# Patient Record
Sex: MALE | Race: OTHER | Employment: UNEMPLOYED | URBAN - METROPOLITAN AREA
[De-identification: names, ages, dates, MRNs, and addresses within clinical notes are randomized per-mention and may not be internally consistent; named-entity substitution may affect disease eponyms.]

---

## 2021-03-10 ENCOUNTER — HOSPITAL ENCOUNTER (OUTPATIENT)
Dept: GENERAL RADIOLOGY | Age: 66
Discharge: HOME | End: 2021-03-10
Payer: MEDICARE

## 2021-03-10 ENCOUNTER — TRANSCRIBE ORDER (OUTPATIENT)
Dept: REGISTRATION | Age: 66
End: 2021-03-10

## 2021-03-10 DIAGNOSIS — M25.561 RIGHT KNEE PAIN: Primary | ICD-10-CM

## 2021-03-10 DIAGNOSIS — M25.561 RIGHT KNEE PAIN: ICD-10-CM

## 2021-03-10 PROCEDURE — 73565 X-RAY EXAM OF KNEES: CPT

## 2021-03-10 PROCEDURE — 73560 X-RAY EXAM OF KNEE 1 OR 2: CPT

## 2022-10-07 ENCOUNTER — APPOINTMENT (OUTPATIENT)
Dept: CT IMAGING | Age: 67
End: 2022-10-07
Attending: EMERGENCY MEDICINE
Payer: MEDICARE

## 2022-10-07 ENCOUNTER — HOSPITAL ENCOUNTER (OUTPATIENT)
Age: 67
Setting detail: OBSERVATION
Discharge: HOME | End: 2022-10-09
Attending: EMERGENCY MEDICINE | Admitting: INTERNAL MEDICINE
Payer: MEDICARE

## 2022-10-07 DIAGNOSIS — R41.82 ALTERED MENTAL STATUS, UNSPECIFIED ALTERED MENTAL STATUS TYPE: Primary | ICD-10-CM

## 2022-10-07 DIAGNOSIS — G93.40 ENCEPHALOPATHY ACUTE: ICD-10-CM

## 2022-10-07 LAB
AMMONIA PLAS-SCNC: 19 UMOL/L
AMPHET UR QL SCN: NEGATIVE
ANION GAP SERPL CALC-SCNC: 4 MMOL/L (ref 5–15)
APPEARANCE UR: CLEAR
BACTERIA URNS QL MICRO: NEGATIVE /HPF
BARBITURATES UR QL SCN: NEGATIVE
BASOPHILS # BLD: 0.1 K/UL (ref 0–0.1)
BASOPHILS NFR BLD: 1 % (ref 0–1)
BENZODIAZ UR QL: NEGATIVE
BILIRUB UR QL: NEGATIVE
BUN SERPL-MCNC: 13 MG/DL (ref 6–20)
BUN/CREAT SERPL: 15 (ref 12–20)
CALCIUM SERPL-MCNC: 8.9 MG/DL (ref 8.5–10.1)
CANNABINOIDS UR QL SCN: POSITIVE
CHLORIDE SERPL-SCNC: 103 MMOL/L (ref 97–108)
CO2 SERPL-SCNC: 30 MMOL/L (ref 21–32)
COCAINE UR QL SCN: NEGATIVE
COLOR UR: NORMAL
COMMENT, HOLDF: NORMAL
CREAT SERPL-MCNC: 0.87 MG/DL (ref 0.7–1.3)
DIFFERENTIAL METHOD BLD: ABNORMAL
DRUG SCRN COMMENT,DRGCM: ABNORMAL
EOSINOPHIL # BLD: 0.1 K/UL (ref 0–0.4)
EOSINOPHIL NFR BLD: 1 % (ref 0–7)
EPITH CASTS URNS QL MICRO: NORMAL /LPF
ERYTHROCYTE [DISTWIDTH] IN BLOOD BY AUTOMATED COUNT: 14.6 % (ref 11.5–14.5)
ETHANOL SERPL-MCNC: <10 MG/DL
GLUCOSE SERPL-MCNC: 152 MG/DL (ref 65–100)
GLUCOSE UR STRIP.AUTO-MCNC: NEGATIVE MG/DL
HCT VFR BLD AUTO: 39 % (ref 36.6–50.3)
HGB BLD-MCNC: 12.7 G/DL (ref 12.1–17)
HGB UR QL STRIP: NEGATIVE
HYALINE CASTS URNS QL MICRO: NORMAL /LPF (ref 0–2)
IMM GRANULOCYTES # BLD AUTO: 0 K/UL (ref 0–0.04)
IMM GRANULOCYTES NFR BLD AUTO: 0 % (ref 0–0.5)
KETONES UR QL STRIP.AUTO: NEGATIVE MG/DL
LEUKOCYTE ESTERASE UR QL STRIP.AUTO: NEGATIVE
LYMPHOCYTES # BLD: 1.6 K/UL (ref 0.8–3.5)
LYMPHOCYTES NFR BLD: 22 % (ref 12–49)
MCH RBC QN AUTO: 29.8 PG (ref 26–34)
MCHC RBC AUTO-ENTMCNC: 32.6 G/DL (ref 30–36.5)
MCV RBC AUTO: 91.5 FL (ref 80–99)
METHADONE UR QL: NEGATIVE
MONOCYTES # BLD: 0.4 K/UL (ref 0–1)
MONOCYTES NFR BLD: 5 % (ref 5–13)
NEUTS SEG # BLD: 5.1 K/UL (ref 1.8–8)
NEUTS SEG NFR BLD: 71 % (ref 32–75)
NITRITE UR QL STRIP.AUTO: NEGATIVE
NRBC # BLD: 0 K/UL (ref 0–0.01)
NRBC BLD-RTO: 0 PER 100 WBC
OPIATES UR QL: POSITIVE
PCP UR QL: NEGATIVE
PH UR STRIP: 8 [PH] (ref 5–8)
PLATELET # BLD AUTO: 299 K/UL (ref 150–400)
PMV BLD AUTO: 9 FL (ref 8.9–12.9)
POTASSIUM SERPL-SCNC: 3.5 MMOL/L (ref 3.5–5.1)
PROT UR STRIP-MCNC: NEGATIVE MG/DL
RBC # BLD AUTO: 4.26 M/UL (ref 4.1–5.7)
RBC #/AREA URNS HPF: NORMAL /HPF (ref 0–5)
SAMPLES BEING HELD,HOLD: NORMAL
SODIUM SERPL-SCNC: 137 MMOL/L (ref 136–145)
SP GR UR REFRACTOMETRY: 1.01 (ref 1–1.03)
UA: UC IF INDICATED,UAUC: NORMAL
UROBILINOGEN UR QL STRIP.AUTO: 0.2 EU/DL (ref 0.2–1)
WBC # BLD AUTO: 7.3 K/UL (ref 4.1–11.1)
WBC URNS QL MICRO: NORMAL /HPF (ref 0–4)

## 2022-10-07 PROCEDURE — 96374 THER/PROPH/DIAG INJ IV PUSH: CPT

## 2022-10-07 PROCEDURE — 96376 TX/PRO/DX INJ SAME DRUG ADON: CPT

## 2022-10-07 PROCEDURE — 85025 COMPLETE CBC W/AUTO DIFF WBC: CPT

## 2022-10-07 PROCEDURE — 74011250636 HC RX REV CODE- 250/636: Performed by: EMERGENCY MEDICINE

## 2022-10-07 PROCEDURE — 70450 CT HEAD/BRAIN W/O DYE: CPT

## 2022-10-07 PROCEDURE — 36415 COLL VENOUS BLD VENIPUNCTURE: CPT

## 2022-10-07 PROCEDURE — 74011250637 HC RX REV CODE- 250/637: Performed by: EMERGENCY MEDICINE

## 2022-10-07 PROCEDURE — 82077 ASSAY SPEC XCP UR&BREATH IA: CPT

## 2022-10-07 PROCEDURE — G0378 HOSPITAL OBSERVATION PER HR: HCPCS

## 2022-10-07 PROCEDURE — 80307 DRUG TEST PRSMV CHEM ANLYZR: CPT

## 2022-10-07 PROCEDURE — 82140 ASSAY OF AMMONIA: CPT

## 2022-10-07 PROCEDURE — 96372 THER/PROPH/DIAG INJ SC/IM: CPT

## 2022-10-07 PROCEDURE — 81001 URINALYSIS AUTO W/SCOPE: CPT

## 2022-10-07 PROCEDURE — 99285 EMERGENCY DEPT VISIT HI MDM: CPT

## 2022-10-07 PROCEDURE — 74011250636 HC RX REV CODE- 250/636: Performed by: INTERNAL MEDICINE

## 2022-10-07 PROCEDURE — 96375 TX/PRO/DX INJ NEW DRUG ADDON: CPT

## 2022-10-07 PROCEDURE — 80048 BASIC METABOLIC PNL TOTAL CA: CPT

## 2022-10-07 RX ORDER — SODIUM CHLORIDE 0.9 % (FLUSH) 0.9 %
5-40 SYRINGE (ML) INJECTION AS NEEDED
Status: DISCONTINUED | OUTPATIENT
Start: 2022-10-07 | End: 2022-10-09 | Stop reason: HOSPADM

## 2022-10-07 RX ORDER — MAGNESIUM SULFATE 100 %
4 CRYSTALS MISCELLANEOUS AS NEEDED
Status: DISCONTINUED | OUTPATIENT
Start: 2022-10-07 | End: 2022-10-09 | Stop reason: HOSPADM

## 2022-10-07 RX ORDER — SODIUM CHLORIDE 0.9 % (FLUSH) 0.9 %
5-40 SYRINGE (ML) INJECTION EVERY 8 HOURS
Status: DISCONTINUED | OUTPATIENT
Start: 2022-10-07 | End: 2022-10-09 | Stop reason: HOSPADM

## 2022-10-07 RX ORDER — QUETIAPINE FUMARATE 25 MG/1
50 TABLET, FILM COATED ORAL ONCE
Status: COMPLETED | OUTPATIENT
Start: 2022-10-07 | End: 2022-10-07

## 2022-10-07 RX ORDER — LORAZEPAM 2 MG/ML
2 INJECTION INTRAMUSCULAR ONCE
Status: COMPLETED | OUTPATIENT
Start: 2022-10-07 | End: 2022-10-07

## 2022-10-07 RX ORDER — DEXTROSE MONOHYDRATE 100 MG/ML
0-250 INJECTION, SOLUTION INTRAVENOUS AS NEEDED
Status: DISCONTINUED | OUTPATIENT
Start: 2022-10-07 | End: 2022-10-09 | Stop reason: HOSPADM

## 2022-10-07 RX ORDER — ONDANSETRON 2 MG/ML
4 INJECTION INTRAMUSCULAR; INTRAVENOUS
Status: DISCONTINUED | OUTPATIENT
Start: 2022-10-07 | End: 2022-10-09 | Stop reason: HOSPADM

## 2022-10-07 RX ORDER — ENOXAPARIN SODIUM 100 MG/ML
30 INJECTION SUBCUTANEOUS EVERY 12 HOURS
Status: DISCONTINUED | OUTPATIENT
Start: 2022-10-07 | End: 2022-10-09 | Stop reason: HOSPADM

## 2022-10-07 RX ORDER — ACETAMINOPHEN 650 MG/1
650 SUPPOSITORY RECTAL
Status: DISCONTINUED | OUTPATIENT
Start: 2022-10-07 | End: 2022-10-09 | Stop reason: HOSPADM

## 2022-10-07 RX ORDER — FENTANYL CITRATE 50 UG/ML
100 INJECTION, SOLUTION INTRAMUSCULAR; INTRAVENOUS ONCE
Status: COMPLETED | OUTPATIENT
Start: 2022-10-07 | End: 2022-10-07

## 2022-10-07 RX ORDER — MIDAZOLAM HYDROCHLORIDE 1 MG/ML
2 INJECTION, SOLUTION INTRAMUSCULAR; INTRAVENOUS ONCE
Status: COMPLETED | OUTPATIENT
Start: 2022-10-07 | End: 2022-10-07

## 2022-10-07 RX ORDER — INSULIN LISPRO 100 [IU]/ML
INJECTION, SOLUTION INTRAVENOUS; SUBCUTANEOUS
Status: DISCONTINUED | OUTPATIENT
Start: 2022-10-07 | End: 2022-10-09 | Stop reason: HOSPADM

## 2022-10-07 RX ORDER — DIPHENHYDRAMINE HYDROCHLORIDE 50 MG/ML
25 INJECTION, SOLUTION INTRAMUSCULAR; INTRAVENOUS ONCE
Status: COMPLETED | OUTPATIENT
Start: 2022-10-07 | End: 2022-10-07

## 2022-10-07 RX ORDER — ACETAMINOPHEN 325 MG/1
650 TABLET ORAL
Status: DISCONTINUED | OUTPATIENT
Start: 2022-10-07 | End: 2022-10-09 | Stop reason: HOSPADM

## 2022-10-07 RX ORDER — ONDANSETRON 4 MG/1
4 TABLET, ORALLY DISINTEGRATING ORAL
Status: DISCONTINUED | OUTPATIENT
Start: 2022-10-07 | End: 2022-10-09 | Stop reason: HOSPADM

## 2022-10-07 RX ORDER — POLYETHYLENE GLYCOL 3350 17 G/17G
17 POWDER, FOR SOLUTION ORAL DAILY PRN
Status: DISCONTINUED | OUTPATIENT
Start: 2022-10-07 | End: 2022-10-09 | Stop reason: HOSPADM

## 2022-10-07 RX ADMIN — FENTANYL CITRATE 100 MCG: 50 INJECTION INTRAMUSCULAR; INTRAVENOUS at 18:03

## 2022-10-07 RX ADMIN — MIDAZOLAM HYDROCHLORIDE 2 MG: 1 INJECTION, SOLUTION INTRAMUSCULAR; INTRAVENOUS at 16:26

## 2022-10-07 RX ADMIN — ENOXAPARIN SODIUM 30 MG: 100 INJECTION SUBCUTANEOUS at 20:13

## 2022-10-07 RX ADMIN — QUETIAPINE FUMARATE 50 MG: 25 TABLET ORAL at 20:13

## 2022-10-07 RX ADMIN — LORAZEPAM 2 MG: 2 INJECTION INTRAMUSCULAR; INTRAVENOUS at 13:59

## 2022-10-07 RX ADMIN — DIPHENHYDRAMINE HYDROCHLORIDE 25 MG: 50 INJECTION, SOLUTION INTRAMUSCULAR; INTRAVENOUS at 18:05

## 2022-10-07 RX ADMIN — LORAZEPAM 2 MG: 2 INJECTION INTRAMUSCULAR; INTRAVENOUS at 14:24

## 2022-10-07 RX ADMIN — SODIUM CHLORIDE 1000 ML: 9 INJECTION, SOLUTION INTRAVENOUS at 14:01

## 2022-10-07 NOTE — ED NOTES
Pt is restless and attempting to get out of bed. Pt tearing off monitoring devises. Unable to obtain accurate blood pressure. Pt in bed with legs raised and side rails up.  Unable to get CT due to pt movement

## 2022-10-07 NOTE — ED PROVIDER NOTES
49-year-old male with history of diabetes, hypertension, obesity, high cholesterol presents to the emergency department by EMS with chief complaint of altered mental status. His daughter called EMS because he has been acting very for the past 3 hours. He is apparently on multiple pain medications including narcotics and may have smoked marijuana this morning. Patient is a poor historian, keeps telling me he is thirsty, fidgeting in the bed. He has removed his pulse oximeter and is trying to remove his chest leads. There is no report of fever, chills, trauma. The history is provided by the patient, the EMS personnel, a relative and medical records. Altered mental status   This is a new problem. The current episode started 3 to 5 hours ago. The problem has not changed since onset. Associated symptoms include confusion. Functional status baseline:  [EPIC#1537^NOTE}      No past medical history on file. No past surgical history on file. No family history on file. Social History     Socioeconomic History    Marital status:      Spouse name: Not on file    Number of children: Not on file    Years of education: Not on file    Highest education level: Not on file   Occupational History    Not on file   Tobacco Use    Smoking status: Not on file    Smokeless tobacco: Not on file   Substance and Sexual Activity    Alcohol use: Not on file    Drug use: Not on file    Sexual activity: Not on file   Other Topics Concern    Not on file   Social History Narrative    Not on file     Social Determinants of Health     Financial Resource Strain: Not on file   Food Insecurity: Not on file   Transportation Needs: Not on file   Physical Activity: Not on file   Stress: Not on file   Social Connections: Not on file   Intimate Partner Violence: Not on file   Housing Stability: Not on file         ALLERGIES: Patient has no known allergies.     Review of Systems   Unable to perform ROS: Mental status change   Psychiatric/Behavioral:  Positive for confusion. Vitals:    10/07/22 1253   BP: (!) 176/95   Pulse: 95   Resp: 24   SpO2: 94%   Weight: 125 kg (275 lb 9.2 oz)            Physical Exam  Vitals and nursing note reviewed. Constitutional:       General: He is not in acute distress. Appearance: Normal appearance. He is well-developed. He is obese. He is not ill-appearing, toxic-appearing or diaphoretic. HENT:      Head: Normocephalic and atraumatic. Nose: Nose normal.      Mouth/Throat:      Mouth: Mucous membranes are moist.      Pharynx: Oropharynx is clear. Eyes:      Extraocular Movements: Extraocular movements intact. Conjunctiva/sclera: Conjunctivae normal.      Pupils: Pupils are equal, round, and reactive to light. Cardiovascular:      Rate and Rhythm: Normal rate and regular rhythm. Pulses: Normal pulses. Heart sounds: No murmur heard. Pulmonary:      Effort: Pulmonary effort is normal. No respiratory distress. Breath sounds: Normal breath sounds. No wheezing. Chest:      Chest wall: No mass or tenderness. Abdominal:      General: There is no distension. Palpations: Abdomen is soft. Tenderness: There is no abdominal tenderness. There is no guarding or rebound. Musculoskeletal:         General: No swelling, tenderness, deformity or signs of injury. Normal range of motion. Cervical back: Normal range of motion and neck supple. No rigidity. No muscular tenderness. Right lower leg: No tenderness. No edema. Left lower leg: No tenderness. No edema. Skin:     General: Skin is warm and dry. Capillary Refill: Capillary refill takes less than 2 seconds. Neurological:      General: No focal deficit present. Mental Status: He is alert. Comments: Moves all extremities, is fidgety but without tremor   Psychiatric:         Behavior: Behavior is hyperactive.         MDM  Number of Diagnoses or Management Options  Diagnosis management comments: 26-year-old male presents as above with altered mental status. He has been encephalopathic of unclear etiology, although suspect substance use/abuse. He was seen by teleneurology with concern for potential stroke who feels that he is very low risk for stroke. Would recommend Seroquel as tolerated to keep him calm. Plan for admission for further management. Amount and/or Complexity of Data Reviewed  Clinical lab tests: reviewed  Tests in the radiology section of CPT®: reviewed      ED Course as of 10/07/22 1857   Fri Oct 07, 2022   1740 Despite repeat doses of benzodiazepines patient continues to have abnormal movement in the bed, is not redirectable, abnormal speech [JM]   1816 Speech still not resolved, nonfocal on exam.  Have been unable to obtain CT thus far. Will elevate to code stroke level 2. He would not be a lytic candidate. [JM]   9139 Discussed with teleneurology, recommends Seroquel x1 to facilitate CTA. We will see the patient once they are back from non-con CT. [JM]   1856 Discussed with teleneurology. Low risk for this to be a stroke. More consistent with encephalopathy. Recommends admission for metabolic/toxic encephalopathy. [JM]      ED Course User Index  [JM] Sheryl Sanabria MD       Procedures          Perfect Serve Consult for Admission  6:58 PM    ED Room Number: ER07/07  Patient Name and age:  Nicole Loza 79 y.o.  male  Working Diagnosis:   1. Altered mental status, unspecified altered mental status type    2.  Encephalopathy acute        COVID-19 Suspicion:  no  Sepsis present:  no  Reassessment needed: N/A  Code Status:  Full Code  Readmission: no  Isolation Requirements:  no  Recommended Level of Care:  telemetry  Department:[facility] ED - [phone]  Other: Encephalopathy likely due to marijuana use earlier today

## 2022-10-07 NOTE — ED TRIAGE NOTES
Pt to ED vis EMS after having new onset of altered mental status starting around 1000 this am. Family reports pt unable to control movement of his arms and legs. Pt has history of chronic pain and significant amounts of pain medication prescription. Family reports pt was smoking marijuana today but don't know if that is new for him to do.

## 2022-10-07 NOTE — ED NOTES
Pt continues to be restless and attempting to get out of bed. Bed alarm on, rails up, legs raised. Attempting to monitor pt but pt ripping monitors off. Pt does not show any signs and symptoms of distress.  Unable to get CT due to pt movement

## 2022-10-07 NOTE — PROGRESS NOTES
Dear Dr Papi Elizalde,    The Lovenox dose/schedule was changed to reflect the new Enoxaparin Routine Prophylaxis Dosing guidelines which is based on the patient's weight and renal function. The dose/schedule was changed from 40mg sq q24h to 30mg sq bid. Thank you,    Venancio Rosado. Zackary, Pharm D.         Contact: [phone]

## 2022-10-08 ENCOUNTER — APPOINTMENT (OUTPATIENT)
Dept: MRI IMAGING | Age: 67
End: 2022-10-08
Attending: INTERNAL MEDICINE
Payer: MEDICARE

## 2022-10-08 ENCOUNTER — APPOINTMENT (OUTPATIENT)
Dept: GENERAL RADIOLOGY | Age: 67
End: 2022-10-08
Attending: INTERNAL MEDICINE
Payer: MEDICARE

## 2022-10-08 LAB
ALBUMIN SERPL-MCNC: 3.3 G/DL (ref 3.5–5)
ALBUMIN/GLOB SERPL: 0.8 {RATIO} (ref 1.1–2.2)
ALP SERPL-CCNC: 60 U/L (ref 45–117)
ALT SERPL-CCNC: 29 U/L (ref 12–78)
AMMONIA PLAS-SCNC: 39 UMOL/L
ANION GAP SERPL CALC-SCNC: 6 MMOL/L (ref 5–15)
AST SERPL-CCNC: 36 U/L (ref 15–37)
BILIRUB SERPL-MCNC: 0.4 MG/DL (ref 0.2–1)
BUN SERPL-MCNC: 14 MG/DL (ref 6–20)
BUN/CREAT SERPL: 19 (ref 12–20)
CALCIUM SERPL-MCNC: 8.6 MG/DL (ref 8.5–10.1)
CHLORIDE SERPL-SCNC: 109 MMOL/L (ref 97–108)
CO2 SERPL-SCNC: 27 MMOL/L (ref 21–32)
CREAT SERPL-MCNC: 0.75 MG/DL (ref 0.7–1.3)
EST. AVERAGE GLUCOSE BLD GHB EST-MCNC: 134 MG/DL
GLOBULIN SER CALC-MCNC: 3.9 G/DL (ref 2–4)
GLUCOSE BLD STRIP.AUTO-MCNC: 103 MG/DL (ref 65–117)
GLUCOSE BLD STRIP.AUTO-MCNC: 104 MG/DL (ref 65–117)
GLUCOSE BLD STRIP.AUTO-MCNC: 112 MG/DL (ref 65–117)
GLUCOSE BLD STRIP.AUTO-MCNC: 123 MG/DL (ref 65–117)
GLUCOSE SERPL-MCNC: 116 MG/DL (ref 65–100)
HBA1C MFR BLD: 6.3 % (ref 4–5.6)
POTASSIUM SERPL-SCNC: 3.2 MMOL/L (ref 3.5–5.1)
PROT SERPL-MCNC: 7.2 G/DL (ref 6.4–8.2)
SERVICE CMNT-IMP: ABNORMAL
SERVICE CMNT-IMP: NORMAL
SODIUM SERPL-SCNC: 142 MMOL/L (ref 136–145)
TSH SERPL DL<=0.05 MIU/L-ACNC: 0.94 UIU/ML (ref 0.36–3.74)
VIT B12 SERPL-MCNC: 202 PG/ML (ref 193–986)

## 2022-10-08 PROCEDURE — 74011250637 HC RX REV CODE- 250/637: Performed by: INTERNAL MEDICINE

## 2022-10-08 PROCEDURE — 83036 HEMOGLOBIN GLYCOSYLATED A1C: CPT

## 2022-10-08 PROCEDURE — 36415 COLL VENOUS BLD VENIPUNCTURE: CPT

## 2022-10-08 PROCEDURE — 82607 VITAMIN B-12: CPT

## 2022-10-08 PROCEDURE — 74011250636 HC RX REV CODE- 250/636: Performed by: INTERNAL MEDICINE

## 2022-10-08 PROCEDURE — 96375 TX/PRO/DX INJ NEW DRUG ADDON: CPT

## 2022-10-08 PROCEDURE — 96376 TX/PRO/DX INJ SAME DRUG ADON: CPT

## 2022-10-08 PROCEDURE — 82140 ASSAY OF AMMONIA: CPT

## 2022-10-08 PROCEDURE — 84443 ASSAY THYROID STIM HORMONE: CPT

## 2022-10-08 PROCEDURE — 74011000250 HC RX REV CODE- 250: Performed by: INTERNAL MEDICINE

## 2022-10-08 PROCEDURE — 99205 OFFICE O/P NEW HI 60 MIN: CPT | Performed by: PSYCHIATRY & NEUROLOGY

## 2022-10-08 PROCEDURE — 82962 GLUCOSE BLOOD TEST: CPT

## 2022-10-08 PROCEDURE — G0378 HOSPITAL OBSERVATION PER HR: HCPCS

## 2022-10-08 PROCEDURE — 96372 THER/PROPH/DIAG INJ SC/IM: CPT

## 2022-10-08 PROCEDURE — 73030 X-RAY EXAM OF SHOULDER: CPT

## 2022-10-08 PROCEDURE — 80053 COMPREHEN METABOLIC PANEL: CPT

## 2022-10-08 RX ORDER — FLUTICASONE PROPIONATE 50 MCG
2 SPRAY, SUSPENSION (ML) NASAL DAILY
Status: DISCONTINUED | OUTPATIENT
Start: 2022-10-08 | End: 2022-10-09 | Stop reason: HOSPADM

## 2022-10-08 RX ORDER — OXYCODONE HYDROCHLORIDE 5 MG/1
15 TABLET ORAL
Status: DISCONTINUED | OUTPATIENT
Start: 2022-10-08 | End: 2022-10-08

## 2022-10-08 RX ORDER — DOXAZOSIN 2 MG/1
8 TABLET ORAL DAILY
Status: DISCONTINUED | OUTPATIENT
Start: 2022-10-08 | End: 2022-10-09 | Stop reason: HOSPADM

## 2022-10-08 RX ORDER — DICLOFENAC SODIUM 75 MG/1
TABLET, DELAYED RELEASE ORAL
COMMUNITY

## 2022-10-08 RX ORDER — OXYCODONE HYDROCHLORIDE 5 MG/1
5 TABLET ORAL
Status: DISCONTINUED | OUTPATIENT
Start: 2022-10-08 | End: 2022-10-08

## 2022-10-08 RX ORDER — FINASTERIDE 5 MG/1
5 TABLET, FILM COATED ORAL DAILY
Status: DISCONTINUED | OUTPATIENT
Start: 2022-10-09 | End: 2022-10-09 | Stop reason: HOSPADM

## 2022-10-08 RX ORDER — PANTOPRAZOLE SODIUM 40 MG/1
40 TABLET, DELAYED RELEASE ORAL
Status: DISCONTINUED | OUTPATIENT
Start: 2022-10-09 | End: 2022-10-09 | Stop reason: HOSPADM

## 2022-10-08 RX ORDER — CELECOXIB 100 MG/1
200 CAPSULE ORAL 2 TIMES DAILY
Status: DISCONTINUED | OUTPATIENT
Start: 2022-10-08 | End: 2022-10-09 | Stop reason: HOSPADM

## 2022-10-08 RX ORDER — DULOXETIN HYDROCHLORIDE 60 MG/1
60 CAPSULE, DELAYED RELEASE ORAL DAILY
COMMUNITY

## 2022-10-08 RX ORDER — TRIAMCINOLONE ACETONIDE 1 MG/G
CREAM TOPICAL 2 TIMES DAILY
COMMUNITY

## 2022-10-08 RX ORDER — CELECOXIB 200 MG/1
CAPSULE ORAL 2 TIMES DAILY
COMMUNITY

## 2022-10-08 RX ORDER — DIPHENHYDRAMINE HYDROCHLORIDE 50 MG/ML
50 INJECTION, SOLUTION INTRAMUSCULAR; INTRAVENOUS
Status: DISCONTINUED | OUTPATIENT
Start: 2022-10-08 | End: 2022-10-09 | Stop reason: HOSPADM

## 2022-10-08 RX ORDER — FAMOTIDINE 40 MG/1
40 TABLET, FILM COATED ORAL
COMMUNITY

## 2022-10-08 RX ORDER — PRAVASTATIN SODIUM 80 MG/1
80 TABLET ORAL DAILY
COMMUNITY

## 2022-10-08 RX ORDER — HYDROMORPHONE HYDROCHLORIDE 1 MG/ML
0.5 INJECTION, SOLUTION INTRAMUSCULAR; INTRAVENOUS; SUBCUTANEOUS
Status: DISCONTINUED | OUTPATIENT
Start: 2022-10-08 | End: 2022-10-09 | Stop reason: HOSPADM

## 2022-10-08 RX ORDER — BENZONATATE 100 MG/1
100 CAPSULE ORAL 3 TIMES DAILY
Status: DISCONTINUED | OUTPATIENT
Start: 2022-10-08 | End: 2022-10-09 | Stop reason: HOSPADM

## 2022-10-08 RX ORDER — POLYETHYLENE GLYCOL 3350 17 G/17G
17 POWDER, FOR SOLUTION ORAL DAILY
COMMUNITY

## 2022-10-08 RX ORDER — DULOXETIN HYDROCHLORIDE 30 MG/1
30 CAPSULE, DELAYED RELEASE ORAL DAILY
Status: DISCONTINUED | OUTPATIENT
Start: 2022-10-09 | End: 2022-10-09 | Stop reason: HOSPADM

## 2022-10-08 RX ORDER — OXYCODONE HYDROCHLORIDE 5 MG/1
15 TABLET ORAL
Status: DISCONTINUED | OUTPATIENT
Start: 2022-10-08 | End: 2022-10-09 | Stop reason: HOSPADM

## 2022-10-08 RX ORDER — OMEPRAZOLE 40 MG/1
40 CAPSULE, DELAYED RELEASE ORAL DAILY
COMMUNITY

## 2022-10-08 RX ORDER — DOXAZOSIN 2 MG/1
8 TABLET ORAL DAILY
Status: DISCONTINUED | OUTPATIENT
Start: 2022-10-09 | End: 2022-10-08

## 2022-10-08 RX ORDER — PRAVASTATIN SODIUM 20 MG/1
80 TABLET ORAL DAILY
Status: DISCONTINUED | OUTPATIENT
Start: 2022-10-09 | End: 2022-10-09 | Stop reason: HOSPADM

## 2022-10-08 RX ORDER — OXYCODONE HYDROCHLORIDE 15 MG/1
15 TABLET ORAL
COMMUNITY

## 2022-10-08 RX ORDER — FAMOTIDINE 20 MG/1
40 TABLET, FILM COATED ORAL
Status: DISCONTINUED | OUTPATIENT
Start: 2022-10-08 | End: 2022-10-09 | Stop reason: HOSPADM

## 2022-10-08 RX ORDER — DOXAZOSIN 8 MG/1
8 TABLET ORAL DAILY
COMMUNITY

## 2022-10-08 RX ORDER — HYDRALAZINE HYDROCHLORIDE 20 MG/ML
20 INJECTION INTRAMUSCULAR; INTRAVENOUS
Status: DISCONTINUED | OUTPATIENT
Start: 2022-10-08 | End: 2022-10-08

## 2022-10-08 RX ORDER — NORTRIPTYLINE HYDROCHLORIDE 50 MG/1
50 CAPSULE ORAL
COMMUNITY

## 2022-10-08 RX ORDER — SILDENAFIL 100 MG/1
25 TABLET, FILM COATED ORAL
COMMUNITY

## 2022-10-08 RX ORDER — HYDROXYZINE 25 MG/1
TABLET, FILM COATED ORAL
COMMUNITY

## 2022-10-08 RX ORDER — FINASTERIDE 5 MG/1
5 TABLET, FILM COATED ORAL DAILY
COMMUNITY

## 2022-10-08 RX ORDER — NORTRIPTYLINE HYDROCHLORIDE 25 MG/1
50 CAPSULE ORAL
Status: DISCONTINUED | OUTPATIENT
Start: 2022-10-08 | End: 2022-10-09 | Stop reason: HOSPADM

## 2022-10-08 RX ORDER — GUAIFENESIN 600 MG/1
600 TABLET, EXTENDED RELEASE ORAL EVERY 12 HOURS
Status: DISCONTINUED | OUTPATIENT
Start: 2022-10-08 | End: 2022-10-09 | Stop reason: HOSPADM

## 2022-10-08 RX ORDER — LABETALOL HCL 20 MG/4 ML
20 SYRINGE (ML) INTRAVENOUS
Status: DISCONTINUED | OUTPATIENT
Start: 2022-10-08 | End: 2022-10-09 | Stop reason: HOSPADM

## 2022-10-08 RX ADMIN — Medication 10 ML: at 07:07

## 2022-10-08 RX ADMIN — BENZONATATE 100 MG: 100 CAPSULE ORAL at 22:00

## 2022-10-08 RX ADMIN — NORTRIPTYLINE HYDROCHLORIDE 50 MG: 25 CAPSULE ORAL at 22:32

## 2022-10-08 RX ADMIN — HYDROMORPHONE HYDROCHLORIDE 0.5 MG: 1 INJECTION, SOLUTION INTRAMUSCULAR; INTRAVENOUS; SUBCUTANEOUS at 10:53

## 2022-10-08 RX ADMIN — DIPHENHYDRAMINE HYDROCHLORIDE 50 MG: 50 INJECTION INTRAMUSCULAR; INTRAVENOUS at 03:17

## 2022-10-08 RX ADMIN — FAMOTIDINE 20 MG: 10 INJECTION INTRAVENOUS at 03:53

## 2022-10-08 RX ADMIN — BENZONATATE 100 MG: 100 CAPSULE ORAL at 17:20

## 2022-10-08 RX ADMIN — ENOXAPARIN SODIUM 30 MG: 100 INJECTION SUBCUTANEOUS at 22:31

## 2022-10-08 RX ADMIN — DOXAZOSIN 8 MG: 2 TABLET ORAL at 18:03

## 2022-10-08 RX ADMIN — Medication 10 ML: at 22:00

## 2022-10-08 RX ADMIN — FLUTICASONE PROPIONATE 2 SPRAY: 50 SPRAY, METERED NASAL at 04:12

## 2022-10-08 RX ADMIN — ENOXAPARIN SODIUM 30 MG: 100 INJECTION SUBCUTANEOUS at 09:26

## 2022-10-08 RX ADMIN — OXYCODONE 15 MG: 5 TABLET ORAL at 22:32

## 2022-10-08 RX ADMIN — GUAIFENESIN 600 MG: 600 TABLET ORAL at 22:31

## 2022-10-08 RX ADMIN — FAMOTIDINE 40 MG: 20 TABLET, FILM COATED ORAL at 22:31

## 2022-10-08 RX ADMIN — CELECOXIB 200 MG: 100 CAPSULE ORAL at 17:20

## 2022-10-08 RX ADMIN — Medication 10 ML: at 14:56

## 2022-10-08 RX ADMIN — HYDRALAZINE HYDROCHLORIDE 20 MG: 20 INJECTION INTRAMUSCULAR; INTRAVENOUS at 02:12

## 2022-10-08 RX ADMIN — OXYCODONE 15 MG: 5 TABLET ORAL at 14:55

## 2022-10-08 NOTE — H&P
Hospitalist Admission Note    NAME:  Alta Mena   :  1955   MRN:  [id number]     Date/Time:  10/7/2022 8:01 PM    Patient PCP: Telma Hanson MD  ________________________________________________________________________    Given the patient's current clinical presentation, I have a high level of concern for decompensation if discharged from the emergency department. Complex decision making was performed, which includes reviewing the patient's available past medical records, laboratory results, and x-ray films. My assessment of this patient's clinical condition and my plan of care is as follows. Assessment / Plan:    1. Acute Metabolic Encephalopathy:    The patient comes in w/ confusion/agitation w/ acute onset earlier today    VSS   WBC normal, Hg 12.7  BUN 13, Cr 0.87  UA neg LE/neg nitrite  UDS +marijuana and +opiates  Head CT - no acute abnormality    Obs patient and cont to monitor. I suspect his acute metabolic encephalopathy is due to marijuana use w/o any other clear etiology    2. Chronic Back Pain:    Hold opiates for now    3. Obtain Med Rec:    No meds in Erx. Obtain from family so we can re-start his regimen. Body mass index is 41.9 kg/m².:  40 or above: Morbid obesity      I have personally reviewed the radiographs, laboratory data in Epic and decisions and statements above are based partially on this personal interpretation. Code Status: Full Code   Surrogate Decision Maker  Diego Ernandez (daughter)  [phone]    Prophylaxis:  Lovenox SQ     Subjective:   CHIEF COMPLAINT: Unable to obtain - AMS    HISTORY OF PRESENT ILLNESS:       The patient is a 78 y/o  M w/ PMH chronic back pain, HTN, HLD, DM2 who is brought in by his family today for altered mental status. His last normal baseline was observed at 5am.  They note that at some point he became extremely confused, agitated w/ slurred speech. He was moving all of his extremities.   There have been no new medications. His daughter suspect possible marijuana use as he smelled like it. No further history can be obtained from the patient or family as they did not witness the acute vent.     PMH:    HTN  HLD  DM2  Chronic back pain   OA  BPH    PSxH:    Gastric Bypass    Social Hx:    No smoking hx  Social alcohol use  ?+marijuana    FH:    M - DM2/HTN    No Known Allergies     Prior to Admission medications    Not on File       REVIEW OF SYSTEMS:  See HPI for details  Patient was not able to provide review of systems due to mental status change      Objective:   VITALS:    Visit Vitals  BP (!) 176/95 (BP 1 Location: Right upper arm, BP Patient Position: At rest)   Pulse (!) 101   Temp 98.5 °F (36.9 °C)   Resp 24   Ht 5' 8\" (1.727 m)   Wt 125 kg (275 lb 9.2 oz)   SpO2 97%   BMI 41.90 kg/m²     PHYSICAL EXAM:    Physical Exam:    Gen: Well-developed, well-nourished, +agitated  HEENT:  Pink conjunctivae, PERRLA, hearing intact to voice, moist mucous membranes  Neck: Supple, without masses, thyroid non-tender  Resp: No accessory muscle use, clear breath sounds without wheezes rales or rhonchi  Card: No murmurs, normal S1, S2 without thrills, bruits or peripheral edema  Abd:  Soft, non-tender, non-distended, normoactive bowel sounds are present, no palpable organomegaly and no detectable hernias  Lymph:  No cervical or inguinal adenopathy  Musc: No cyanosis or clubbing  Skin: No rashes or ulcers, skin turgor is good  Neuro:  Unable to do neuro exam d/t AMS  Psych:  Unable to do psych exam d/t AMS  _______________________________________________________________________  Care Plan discussed with:  Pt's condition, Imaging findings, Lab findings, Assessment, and Care Plan discussed with: Patient and RN  _______________________________________________________________________    Probable disposition:  Home  ________________________________________________________________________      Comments   >50% of visit spent in counseling and coordination of care  Chart reviewed  Discussion with patient and/or family and questions answered     ________________________________________________________________________  Signed: Crystal Reyes MD        Procedures: see electronic medical records for all procedures/Xrays and details which were not copied into this note but were reviewed prior to creation of Plan. LAB DATA REVIEWED:    Recent Results (from the past 24 hour(s))   SAMPLES BEING HELD    Collection Time: 10/07/22  1:02 PM   Result Value Ref Range    SAMPLES BEING HELD 1DK GRN,1BLUE,1SST,1RED     COMMENT        Add-on orders for these samples will be processed based on acceptable specimen integrity and analyte stability, which may vary by analyte. CBC WITH AUTOMATED DIFF    Collection Time: 10/07/22  1:02 PM   Result Value Ref Range    WBC 7.3 4.1 - 11.1 K/uL    RBC 4.26 4.10 - 5.70 M/uL    HGB 12.7 12.1 - 17.0 g/dL    HCT 39.0 36.6 - 50.3 %    MCV 91.5 80.0 - 99.0 FL    MCH 29.8 26.0 - 34.0 PG    MCHC 32.6 30.0 - 36.5 g/dL    RDW 14.6 (H) 11.5 - 14.5 %    PLATELET 964 631 - 026 K/uL    MPV 9.0 8.9 - 12.9 FL    NRBC 0.0 0  WBC    ABSOLUTE NRBC 0.00 0.00 - 0.01 K/uL    NEUTROPHILS 71 32 - 75 %    LYMPHOCYTES 22 12 - 49 %    MONOCYTES 5 5 - 13 %    EOSINOPHILS 1 0 - 7 %    BASOPHILS 1 0 - 1 %    IMMATURE GRANULOCYTES 0 0.0 - 0.5 %    ABS. NEUTROPHILS 5.1 1.8 - 8.0 K/UL    ABS. LYMPHOCYTES 1.6 0.8 - 3.5 K/UL    ABS. MONOCYTES 0.4 0.0 - 1.0 K/UL    ABS. EOSINOPHILS 0.1 0.0 - 0.4 K/UL    ABS. BASOPHILS 0.1 0.0 - 0.1 K/UL    ABS. IMM.  GRANS. 0.0 0.00 - 0.04 K/UL    DF AUTOMATED     METABOLIC PANEL, BASIC    Collection Time: 10/07/22  1:02 PM   Result Value Ref Range    Sodium 137 136 - 145 mmol/L    Potassium 3.5 3.5 - 5.1 mmol/L    Chloride 103 97 - 108 mmol/L    CO2 30 21 - 32 mmol/L    Anion gap 4 (L) 5 - 15 mmol/L    Glucose 152 (H) 65 - 100 mg/dL    BUN 13 6 - 20 MG/DL    Creatinine 0.87 0.70 - 1.30 MG/DL    BUN/Creatinine ratio 15 12 - 20      eGFR >60 >60 ml/min/1.73m2    Calcium 8.9 8.5 - 10.1 MG/DL   AMMONIA    Collection Time: 10/07/22  1:02 PM   Result Value Ref Range    Ammonia, plasma 19 <32 UMOL/L   ETHYL ALCOHOL    Collection Time: 10/07/22  1:02 PM   Result Value Ref Range    ALCOHOL(ETHYL),SERUM <10 <10 MG/DL   DRUG SCREEN, URINE    Collection Time: 10/07/22  1:30 PM   Result Value Ref Range    AMPHETAMINES Negative NEG      BARBITURATES Negative NEG      BENZODIAZEPINES Negative NEG      COCAINE Negative NEG      METHADONE Negative NEG      OPIATES Positive (A) NEG      PCP(PHENCYCLIDINE) Negative NEG      THC (TH-CANNABINOL) Positive (A) NEG      Drug screen comment (NOTE)    URINALYSIS W/ REFLEX CULTURE    Collection Time: 10/07/22  1:30 PM    Specimen: Urine   Result Value Ref Range    Color YELLOW/STRAW      Appearance CLEAR CLEAR      Specific gravity 1.011 1.003 - 1.030      pH (UA) 8.0 5.0 - 8.0      Protein Negative NEG mg/dL    Glucose Negative NEG mg/dL    Ketone Negative NEG mg/dL    Bilirubin Negative NEG      Blood Negative NEG      Urobilinogen 0.2 0.2 - 1.0 EU/dL    Nitrites Negative NEG      Leukocyte Esterase Negative NEG      UA:UC IF INDICATED CULTURE NOT INDICATED BY UA RESULT CNI      WBC 0-4 0 - 4 /hpf    RBC 0-5 0 - 5 /hpf    Epithelial cells FEW FEW /lpf    Bacteria Negative NEG /hpf    Hyaline cast 0-2 0 - 2 /lpf

## 2022-10-08 NOTE — PROGRESS NOTES
Late entry    At 2330 : Patient received from ED, patient is restless and confuses, words are slurred are unintelligible. Moves on the bed attempting to get out of the bed and tries to pull his IV. Son at bedside. Patient kept safe in the bed. Passed urine through urinal, 300mils. Sitter at bedside. Blood pressure not able to take accurately as patient is not cooperative. Will wait for him to relax. At 0000 Patient complaints he wants to pass urine, passed 100 mils urine via urinal.      At 0030 patient complaints he wants to pass again but unable to, checked with bladder scanner noted 12 mils. Blood pressure checked, Systolic above 371, informed Dr Jackie Sullivan. Patient also congested on the nose. At 0100 Patient noted to be more awake and communicative. Son and sitter able to understand the patient. At 0210 BP checked, hydralazine given. Patient needs attended. At 9686 Patient noted lips are becoming swollen and patient complaints of difficulty swallowing. He verbalizes he feels his throat is inflamed. Vital signs are being taken . No difficulty breathing. 100% oxygen saturation at room air. Informed Dr. Jackie Sullivan. Diphenhydramine given as ordered. At 0353 Famotidine given. Patient is more awake and is able to verbalize his name, birthday, where he is and what month it is. Patient needs attended, passed urine, perineal care done. At 0857 Nasal spray given as per patient's request.  Patient needs attended, stable and not in distress. Monitored closely. 0630 Patient is more settled with on and off confusion. Son and sitter at bedside. Patient needs attended. Bedside shift change report given to [address] [place] [address] (oncoming nurse) by Bandar Larkin RN (offgoing nurse). Report included the following information SBAR, Kardex, Intake/Output, MAR, Recent Results, and Med Rec Status.      0900 Son gave list of medications patient is taking at home, but unable to provide which medications he was able to take yesterday. Will clarify with the daughter, will hand over to the morning shift on duty.

## 2022-10-08 NOTE — PROGRESS NOTES
Verbal shift change report given to Brenna De La Paz  (oncoming nurse) by Stephanie Montes De Oca (offgoing nurse). Report included the following information SBAR, ED Summary, Intake/Output, MAR, and Recent Results.

## 2022-10-08 NOTE — ED NOTES
TRANSFER - OUT REPORT:    Verbal report given to Kristy MCFADDEN(name) on Leonardo Dunaway  being transferred to Ortho(unit) for routine progression of care       Report consisted of patients Situation, Background, Assessment and   Recommendations(SBAR). Information from the following report(s) SBAR, Kardex, ED Summary, [facility] and Recent Results was reviewed with the receiving nurse. Lines:   Peripheral IV 10/07/22 Left Antecubital (Active)   Site Assessment Clean, dry, & intact 10/07/22 1346   Phlebitis Assessment 0 10/07/22 1346   Infiltration Assessment 0 10/07/22 1346   Dressing Status Clean, dry, & intact 10/07/22 1346   Dressing Type Transparent 10/07/22 1346   Hub Color/Line Status Pink 10/07/22 1346        Opportunity for questions and clarification was provided.       Patient transported with: Transport

## 2022-10-08 NOTE — PROGRESS NOTES
Kyle Irby [facility] [address], [place], [address] [facility]  [phone]      Medical Progress Note      NAME: Yocasta Huynh   :  1955  MRM:  [id number]    Date/Time of service: 10/8/2022         Subjective:     Chief Complaint:  Patient was personally seen and examined by me during this time period. Chart reviewed. Follow-up altered mental status. Improved mentation this morning and oriented x3; son at bedside does state that patient still with some intermittent confusion. Endorses left shoulder pain. Denies any chest pain or shortness of breath. Denies any loss of bowel or bladder function. He does endorse chills; no swelling. Objective:       Vitals:       Last 24hrs VS reviewed since prior progress note.  Most recent are:    Visit Vitals  /75 (BP 1 Location: Right upper arm, BP Patient Position: At rest)   Pulse 75   Temp 98 °F (36.7 °C)   Resp 20   Ht 5' 8\" (1.727 m)   Wt 125 kg (275 lb 9.2 oz)   SpO2 95%   BMI 41.90 kg/m²     SpO2 Readings from Last 6 Encounters:   10/08/22 95%          Intake/Output Summary (Last 24 hours) at 10/8/2022 1236  Last data filed at 10/8/2022 0437  Gross per 24 hour   Intake --   Output 725 ml   Net -725 ml        Exam:     Physical Exam:    Gen:  Well-developed, well-nourished, in no acute distress  HEENT:  Pink conjunctivae, PERRL, hearing intact to voice  Resp:  No accessory muscle use, clear breath sounds without wheezes rales or rhonchi  Card:  RRR, No murmurs, normal S1, S2, no peripheral edema  Abd:  Soft, non-tender, non-distended, normoactive bowel sounds are present  Musc:  No cyanosis or clubbing  Skin:  No rashes or ulcers, skin turgor is good  Neuro:  Cranial nerves 3-12 are grossly intact, follows commands appropriately  Psych:  Oriented to person, place, and time, Alert with good insight      Medications Reviewed: (see below)    Lab Data Reviewed: (see below)    ______________________________________________________________________    Medications:     Current Facility-Administered Medications   Medication Dose Route Frequency    hydrALAZINE (APRESOLINE) 20 mg/mL injection 20 mg  20 mg IntraVENous Q4H PRN    fluticasone propionate (FLONASE) 50 mcg/actuation nasal spray 2 Spray  2 Spray Both Nostrils DAILY    diphenhydrAMINE (BENADRYL) injection 50 mg  50 mg IntraVENous Q6H PRN    famotidine (PF) (PEPCID) 20 mg in 0.9% sodium chloride 10 mL injection  20 mg IntraVENous Q12H    HYDROmorphone (DILAUDID) syringe 0.5 mg  0.5 mg IntraVENous Q6H PRN    gadoteridoL (PROHANCE) 279.3 mg/mL contrast solution 20 mL  20 mL IntraVENous RAD ONCE    sodium chloride (NS) flush 5-40 mL  5-40 mL IntraVENous Q8H    sodium chloride (NS) flush 5-40 mL  5-40 mL IntraVENous PRN    acetaminophen (TYLENOL) tablet 650 mg  650 mg Oral Q6H PRN    Or    acetaminophen (TYLENOL) suppository 650 mg  650 mg Rectal Q6H PRN    polyethylene glycol (MIRALAX) packet 17 g  17 g Oral DAILY PRN    ondansetron (ZOFRAN ODT) tablet 4 mg  4 mg Oral Q8H PRN    Or    ondansetron (ZOFRAN) injection 4 mg  4 mg IntraVENous Q6H PRN    enoxaparin (LOVENOX) injection 30 mg  30 mg SubCUTAneous Q12H    insulin lispro (HUMALOG) injection   SubCUTAneous QID WITH MEALS    glucose chewable tablet 16 g  4 Tablet Oral PRN    glucagon (GLUCAGEN) injection 1 mg  1 mg IntraMUSCular PRN    dextrose 10% infusion 0-250 mL  0-250 mL IntraVENous PRN          Lab Review:     Recent Labs     10/07/22  1302   WBC 7.3   HGB 12.7   HCT 39.0        Recent Labs     10/07/22  1302      K 3.5      CO2 30   *   BUN 13   CREA 0.87   CA 8.9     Lab Results   Component Value Date/Time    Glucose (POC) 123 (H) 10/08/2022 11:18 AM    Glucose (POC) 103 10/08/2022 08:47 AM    Glucose (POC) 104 10/08/2022 01:12 AM          Assessment / Plan:     Acute metabolic encephalopathy POA: With associated agitation.   Possibly due to marijuana. Home opiates and/or  polypharmacy may be contributing. CT head with no acute concerns. Will obtain MRI brain. Obtain EEG. UA with no evidence of infection. Check TSH. Check ammonia. Consult nephrology. Left shoulder pain POA: Obtain shoulder x-ray. Monitor. Chronic back pain/OA : Continue home Celebrex, nortriptyline. Reduce home Cymbalta dose for now. Continue home immediate release oxycodone; patient recently given prescription for extended release oxycodone however according to family they are unable to determine whether patient has actually started taking this -would recommend avoiding extended release due to patient's age. IV Dilaudid as needed for MRI. Diabetes: Obtain A1c. No home meds noted. Insulin sliding scale. Hypertension: Continue Cardura. IV hydralazine as needed. Hyperlipidemia: Continue statin. BPH: Continue finasteride. GERD: Continue PPI and H2 blocker.       Total time spent with patient: 28 Minutes **I personally saw and examined the patient during this time period**                 Care Plan discussed with: Patient, Family, and Nursing Staff    Discussed:  Care Plan    Prophylaxis:  Lovenox    Disposition:  Home w/Family           ___________________________________________________    Attending Physician: Blank Burrows DO

## 2022-10-08 NOTE — PROGRESS NOTES
10/8/2022  4:34 PM  CM completed assessment w/ pt's daughter, as pt was off the floor for MRI. Charted demographics verified, pt lives w/ spouse, his son and daughter in a 2 story home, there are 4 ALYSON and pt has a 1st floor bed/bath. At baseline pt is ambulatory, independent w/ his ADLs, and drives, no fall history. Reason for Admission:  OBS for Altered Mental Status  Pt is a 80 yo [address] male who presents to [facility] c/o   AMS off from baseline. PMHx:  HTN  HLD  DM2  Chronic back pain   OA  BPH                   RUR Score:   N/A pt is OBS Low Risk of Readmission. [address] for utilizing home health:      NO history of HH or Rehab, pt is independent at baseline for his ADLs  DME: Pt uses and owns no DME  Rx: [employer] Medicaid, pt  uses [employer] and is covered for his medications   COVID Vax Hx; reports to be vaccinated w/ 2 jabs and 2 boosters most recent in 2022    PCP: First and Last name:  Telma Hanson MD     Name of Practice:    Are you a current patient: Yes/No: yes    Approximate date of last visit: 30 days   Can you participate in a virtual visit with your PCP: yes                     Current Advanced Directive/Advance Care Plan: Full Code  HCDM daughter Xavi Murillo:   Click here to complete [address] including selection of the Healthcare Decision Maker Relationship (ie \"Primary\")                             Transition of Care Plan:                    RUR N/A pt is OBS  Low Risk of Readmission/Green  LOS 1 Day  Hospital admission for medical management   Neurology consult, MRI  CM to follow through for treatment/response  DC when stable to home w/ family assistance  Outpatient follow up PCP, specialists  Family will transport at DC   CM will continue to follow and assist w/ DC needs  Care Management Interventions  PCP Verified by CM:  Yes Bryson Demarco MD)  Palliative Care Criteria Met (RRAT>21 & CHF Dx)?: No  Mode of Transport at Discharge: Self (Family)  Physical Therapy Consult: No  Occupational Therapy Consult: No  Support Systems: Spouse/Significant Other, Child(kaitlin) (pt lives w/ spouse, son and daughter in pvt residence, at baseline pt is ambualtory,iADLs, drives)  Confirm Follow Up Transport: Family  Discharge Location  Patient Expects to be Discharged to[de-identified] Home with family assistance  JERRY Henry

## 2022-10-08 NOTE — PROGRESS NOTES
Patient A/Ox4 at this time. Passed swallow screen and remained at bedside for lunch to observe patient.  No difficulty swallowing

## 2022-10-08 NOTE — PROGRESS NOTES
10/8/2022  4:26 PM  Medicare Outpatient Observation Notice (MOON)/ [place] Outpatient Observation Notice (Mary Block) provided to patient/representative with verbal explanation of the notice. Time allotted for questions regarding the notice. Patient /representative provided a completed copy of the MOON/VOON notice. Copy placed on bedside chart.   JERRY Henry

## 2022-10-08 NOTE — CONSULTS
NEUROLOGY IN-PATIENT NEW CONSULTATION      10/8/2022    RE: Michaelle Vu         1955      REFERRED BY:  Zaina Borrego MD      CHIEF COMPLAINT:  This is Michaelle Vu is a 79 y.o. male   who had concerns including Altered mental status. HPI:     Patient was bought by family for increased confusion, agitation and slurred speech. History of chronic lower back pain for 15 yrs  and taking pain meds    Patient tried marijuana for the first time. Unable to tolerate MRI brain due to severe lower back pain. Cognitively, patient is better. ROS  (-) fever  (-) rash  All other systems reviewed and are negative    Past Medical Hx  No past medical history on file. Social Hx  Social History     Socioeconomic History    Marital status:        Family Hx  No family history on file.     ALLERGIES  No Known Allergies    CURRENT MEDS  Current Facility-Administered Medications   Medication Dose Route Frequency Provider Last Rate Last Admin    hydrALAZINE (APRESOLINE) 20 mg/mL injection 20 mg  20 mg IntraVENous Q4H PRN Cira Vila MD   20 mg at 10/08/22 0212    fluticasone propionate (FLONASE) 50 mcg/actuation nasal spray 2 Spray  2 Spray Both Nostrils DAILY Cira Vila MD   2 Spray at 10/08/22 0412    diphenhydrAMINE (BENADRYL) injection 50 mg  50 mg IntraVENous Q6H PRN Cira Vila MD   50 mg at 10/08/22 0317    famotidine (PF) (PEPCID) 20 mg in 0.9% sodium chloride 10 mL injection  20 mg IntraVENous Q12H Cira Vila MD   20 mg at 10/08/22 0353    HYDROmorphone (DILAUDID) syringe 0.5 mg  0.5 mg IntraVENous Q6H PRN Pat Luevano DO   0.5 mg at 10/08/22 1053    gadoteridoL (PROHANCE) 279.3 mg/mL contrast solution 20 mL  20 mL IntraVENous RAD ONCE Tej Price MD        sodium chloride (NS) flush 5-40 mL  5-40 mL IntraVENous Q8H Cira Vila MD   10 mL at 10/08/22 0707    sodium chloride (NS) flush 5-40 mL  5-40 mL IntraVENous PRN Cira Vila MD        acetaminophen (TYLENOL) tablet 650 mg  650 mg Oral Q6H PRN Anne Amin MD        Or    acetaminophen (TYLENOL) suppository 650 mg  650 mg Rectal Q6H PRN Anne Amin MD        polyethylene glycol (MIRALAX) packet 17 g  17 g Oral DAILY PRN Anne Amin MD        ondansetron (ZOFRAN ODT) tablet 4 mg  4 mg Oral Q8H PRN Anne Amin MD        Or    ondansetron [facility]) injection 4 mg  4 mg IntraVENous Q6H PRN Anne Amin MD        enoxaparin (LOVENOX) injection 30 mg  30 mg SubCUTAneous Q12H Anne Amin MD   30 mg at 10/08/22 1241    insulin lispro (HUMALOG) injection   SubCUTAneous QID WITH MEALS Benjamin Chau MD        glucose chewable tablet 16 g  4 Tablet Oral PRN Anne Amin MD        glucagon (GLUCAGEN) injection 1 mg  1 mg IntraMUSCular PRN Anne Amin MD        dextrose 10% infusion 0-250 mL  0-250 mL IntraVENous PRN Anne Amin MD               PREVIOUS WORKUP: (reviewed)  IMAGING:    CT Results (recent):  Results from [place] encounter on 10/07/22    CT CODE NEURO HEAD WO CONTRAST    Narrative  EXAM: CT CODE NEURO HEAD WO CONTRAST    INDICATION: abnormal speech    COMPARISON: None. CONTRAST: None. TECHNIQUE: Unenhanced CT of the head was performed using 5 mm images. Brain and  bone windows were generated. Coronal and sagittal reformats. CT dose reduction  was achieved through use of a standardized protocol tailored for this  examination and automatic exposure control for dose modulation. FINDINGS: Limited by motion. No acute intracranial abnormality. Limited by  motion. The ventricles and sulci are normal in size, shape and configuration. . There is  no significant white matter disease. There is no intracranial hemorrhage,  extra-axial collection, or mass effect. The basilar cisterns are open. No CT  evidence of acute infarct. The bone windows demonstrate no abnormalities. The visualized portions of the  paranasal sinuses and mastoid air cells are clear. Impression  No acute intracranial abnormality.       MRI Results (recent):  No results found for this or any previous visit. IR Results (recent):  No results found for this or any previous visit. VAS/US Results (recent):  No results found for this or any previous visit. LABS (reviewed)  Results for orders placed or performed during the hospital encounter of 10/07/22   SAMPLES BEING HELD   Result Value Ref Range    SAMPLES BEING HELD 1DK GRN,1BLUE,1SST,1RED     COMMENT        Add-on orders for these samples will be processed based on acceptable specimen integrity and analyte stability, which may vary by analyte. CBC WITH AUTOMATED DIFF   Result Value Ref Range    WBC 7.3 4.1 - 11.1 K/uL    RBC 4.26 4.10 - 5.70 M/uL    HGB 12.7 12.1 - 17.0 g/dL    HCT 39.0 36.6 - 50.3 %    MCV 91.5 80.0 - 99.0 FL    MCH 29.8 26.0 - 34.0 PG    MCHC 32.6 30.0 - 36.5 g/dL    RDW 14.6 (H) 11.5 - 14.5 %    PLATELET 408 662 - 183 K/uL    MPV 9.0 8.9 - 12.9 FL    NRBC 0.0 0  WBC    ABSOLUTE NRBC 0.00 0.00 - 0.01 K/uL    NEUTROPHILS 71 32 - 75 %    LYMPHOCYTES 22 12 - 49 %    MONOCYTES 5 5 - 13 %    EOSINOPHILS 1 0 - 7 %    BASOPHILS 1 0 - 1 %    IMMATURE GRANULOCYTES 0 0.0 - 0.5 %    ABS. NEUTROPHILS 5.1 1.8 - 8.0 K/UL    ABS. LYMPHOCYTES 1.6 0.8 - 3.5 K/UL    ABS. MONOCYTES 0.4 0.0 - 1.0 K/UL    ABS. EOSINOPHILS 0.1 0.0 - 0.4 K/UL    ABS. BASOPHILS 0.1 0.0 - 0.1 K/UL    ABS. IMM.  GRANS. 0.0 0.00 - 0.04 K/UL    DF AUTOMATED     METABOLIC PANEL, BASIC   Result Value Ref Range    Sodium 137 136 - 145 mmol/L    Potassium 3.5 3.5 - 5.1 mmol/L    Chloride 103 97 - 108 mmol/L    CO2 30 21 - 32 mmol/L    Anion gap 4 (L) 5 - 15 mmol/L    Glucose 152 (H) 65 - 100 mg/dL    BUN 13 6 - 20 MG/DL    Creatinine 0.87 0.70 - 1.30 MG/DL    BUN/Creatinine ratio 15 12 - 20      eGFR >60 >60 ml/min/1.73m2    Calcium 8.9 8.5 - 10.1 MG/DL   AMMONIA   Result Value Ref Range    Ammonia, plasma 19 <32 UMOL/L   DRUG SCREEN, URINE   Result Value Ref Range    AMPHETAMINES Negative NEG      BARBITURATES Negative NEG      BENZODIAZEPINES Negative NEG      COCAINE Negative NEG      METHADONE Negative NEG      OPIATES Positive (A) NEG      PCP(PHENCYCLIDINE) Negative NEG      THC (TH-CANNABINOL) Positive (A) NEG      Drug screen comment (NOTE)    URINALYSIS W/ REFLEX CULTURE    Specimen: Urine   Result Value Ref Range    Color YELLOW/STRAW      Appearance CLEAR CLEAR      Specific gravity 1.011 1.003 - 1.030      pH (UA) 8.0 5.0 - 8.0      Protein Negative NEG mg/dL    Glucose Negative NEG mg/dL    Ketone Negative NEG mg/dL    Bilirubin Negative NEG      Blood Negative NEG      Urobilinogen 0.2 0.2 - 1.0 EU/dL    Nitrites Negative NEG      Leukocyte Esterase Negative NEG      UA:UC IF INDICATED CULTURE NOT INDICATED BY UA RESULT CNI      WBC 0-4 0 - 4 /hpf    RBC 0-5 0 - 5 /hpf    Epithelial cells FEW FEW /lpf    Bacteria Negative NEG /hpf    Hyaline cast 0-2 0 - 2 /lpf   ETHYL ALCOHOL   Result Value Ref Range    ALCOHOL(ETHYL),SERUM <10 <10 MG/DL   GLUCOSE, POC   Result Value Ref Range    Glucose (POC) 104 65 - 117 mg/dL    Performed by CASI Guan 66, POC   Result Value Ref Range    Glucose (POC) 103 65 - 117 mg/dL    Performed by Stas Almodovar    GLUCOSE, POC   Result Value Ref Range    Glucose (POC) 123 (H) 65 - 117 mg/dL    Performed by Jabier Sotelo        Physical Exam:   Visit Vitals  /75 (BP 1 Location: Right upper arm, BP Patient Position: At rest)   Pulse 75   Temp 98 °F (36.7 °C)   Resp 20   Ht 5' 8\" (1.727 m)   Wt 125 kg (275 lb 9.2 oz)   SpO2 95%   BMI 41.90 kg/m²     General:  Alert, cooperative, no distress. Head:  Normocephalic, without obvious abnormality, atraumatic. Eyes:  Conjunctivae/corneas clear. Lungs:  Heart:   Non labored breathing  Regular rate and rhythm, no carotid bruits   Abdomen:   Soft, non-distended   Extremities: Extremities normal, atraumatic, no cyanosis or edema. Pulses: 2+ and symmetric all extremities.    Skin: Skin color, texture, turgor normal. No rashes or lesions. Neurologic Exam     Gen: Attention normal             Awake, alert, follows commands  Knows [facility], president  Able to name and repeat  Clear speech  Cranial Nerves:  I: smell Not tested   II: visual fields Full to confrontation   II: pupils Equal, round, reactive to light   II: optic disc No papilledema   III,VII: ptosis none   III,IV,VI: extraocular muscles  Full ROM   V: mastication normal   V: facial light touch sensation  normal   VII: facial muscle function   symmetric   VIII: hearing symmetric   IX: soft palate elevation  normal   XI: trapezius strength  5/5   XI: sternocleidomastoid strength 5/5   XI: neck flexion strength  5/5   XII: tongue  midline     Motor: normal bulk and tone, no tremor              Strength: 5/5 all four extremities  Sensory: intact to LT, PP, vibration, and temperature  Reflexes: 1+ throughout; Down going toes  Coordination: Good FTN and HTS  Gait: deferred           Impression:     Sherry Mosher is a 79 y.o. male who has history of gastric bypasss, chronic lower back pain for 15 yrs  and taking pain med who was bought by family for increased confusion, agitation and slurred speech. Patient tried marijuana for the first time. Consideration includes acute encephalopathy due to polypharmacy/pain meds along with taking marijuana for the first time. Unable to tolerate MRI brain due to severe lower back pain. Utox (+) Opiate and THC    RECOMMENDATIONS  1. I had a long discussion with patient and son. Discussed diagnosis, prognosis, pathophysiology and available treatment. Reviewed test results. All questions were answered. 2. Unable to tolerate MRI brain due to lower back pain. Since patient is doing better and stroke is very low in the differential, will hold off pursuing MRI brain. 3. Follow up ammonia and TSH. Will order Vit B12 (history of gastric bypass  4.  Will  defer pain management to medical team      Please call for questions        Thank you for the consultation      Naga Bro MD  Diplomate, American Board of Psychiatry and Neurology  Diplomate, Neuromuscular Medicine  Diplomate, American Board of Electrodiagnostic Medicine    Greater than 50% of time spent counseling patient        CC: Tobias Gamble MD  Fax: [phone]

## 2022-10-09 ENCOUNTER — APPOINTMENT (OUTPATIENT)
Dept: ULTRASOUND IMAGING | Age: 67
End: 2022-10-09
Attending: INTERNAL MEDICINE
Payer: MEDICARE

## 2022-10-09 VITALS
TEMPERATURE: 97.7 F | BODY MASS INDEX: 41.77 KG/M2 | DIASTOLIC BLOOD PRESSURE: 83 MMHG | HEART RATE: 80 BPM | SYSTOLIC BLOOD PRESSURE: 130 MMHG | WEIGHT: 275.57 LBS | OXYGEN SATURATION: 96 % | HEIGHT: 68 IN | RESPIRATION RATE: 16 BRPM

## 2022-10-09 LAB
ALBUMIN SERPL-MCNC: 3.4 G/DL (ref 3.5–5)
ALBUMIN/GLOB SERPL: 0.9 {RATIO} (ref 1.1–2.2)
ALP SERPL-CCNC: 60 U/L (ref 45–117)
ALT SERPL-CCNC: 30 U/L (ref 12–78)
ANION GAP SERPL CALC-SCNC: 7 MMOL/L (ref 5–15)
AST SERPL-CCNC: 35 U/L (ref 15–37)
BASOPHILS # BLD: 0.1 K/UL (ref 0–0.1)
BASOPHILS NFR BLD: 1 % (ref 0–1)
BILIRUB SERPL-MCNC: 0.6 MG/DL (ref 0.2–1)
BUN SERPL-MCNC: 19 MG/DL (ref 6–20)
BUN/CREAT SERPL: 25 (ref 12–20)
CALCIUM SERPL-MCNC: 8.8 MG/DL (ref 8.5–10.1)
CHLORIDE SERPL-SCNC: 107 MMOL/L (ref 97–108)
CO2 SERPL-SCNC: 26 MMOL/L (ref 21–32)
CREAT SERPL-MCNC: 0.76 MG/DL (ref 0.7–1.3)
DIFFERENTIAL METHOD BLD: ABNORMAL
EOSINOPHIL # BLD: 0.1 K/UL (ref 0–0.4)
EOSINOPHIL NFR BLD: 1 % (ref 0–7)
ERYTHROCYTE [DISTWIDTH] IN BLOOD BY AUTOMATED COUNT: 15 % (ref 11.5–14.5)
GLOBULIN SER CALC-MCNC: 3.8 G/DL (ref 2–4)
GLUCOSE BLD STRIP.AUTO-MCNC: 92 MG/DL (ref 65–117)
GLUCOSE SERPL-MCNC: 101 MG/DL (ref 65–100)
HCT VFR BLD AUTO: 36.1 % (ref 36.6–50.3)
HGB BLD-MCNC: 12.2 G/DL (ref 12.1–17)
IMM GRANULOCYTES # BLD AUTO: 0 K/UL (ref 0–0.04)
IMM GRANULOCYTES NFR BLD AUTO: 0 % (ref 0–0.5)
LYMPHOCYTES # BLD: 2.3 K/UL (ref 0.8–3.5)
LYMPHOCYTES NFR BLD: 28 % (ref 12–49)
MCH RBC QN AUTO: 30 PG (ref 26–34)
MCHC RBC AUTO-ENTMCNC: 33.8 G/DL (ref 30–36.5)
MCV RBC AUTO: 88.7 FL (ref 80–99)
MONOCYTES # BLD: 0.9 K/UL (ref 0–1)
MONOCYTES NFR BLD: 10 % (ref 5–13)
NEUTS SEG # BLD: 4.9 K/UL (ref 1.8–8)
NEUTS SEG NFR BLD: 60 % (ref 32–75)
NRBC # BLD: 0 K/UL (ref 0–0.01)
NRBC BLD-RTO: 0 PER 100 WBC
PLATELET # BLD AUTO: 289 K/UL (ref 150–400)
PMV BLD AUTO: 9 FL (ref 8.9–12.9)
POTASSIUM SERPL-SCNC: 3.1 MMOL/L (ref 3.5–5.1)
PROT SERPL-MCNC: 7.2 G/DL (ref 6.4–8.2)
RBC # BLD AUTO: 4.07 M/UL (ref 4.1–5.7)
SERVICE CMNT-IMP: NORMAL
SODIUM SERPL-SCNC: 140 MMOL/L (ref 136–145)
WBC # BLD AUTO: 8.2 K/UL (ref 4.1–11.1)

## 2022-10-09 PROCEDURE — 80053 COMPREHEN METABOLIC PANEL: CPT

## 2022-10-09 PROCEDURE — 36415 COLL VENOUS BLD VENIPUNCTURE: CPT

## 2022-10-09 PROCEDURE — 82962 GLUCOSE BLOOD TEST: CPT

## 2022-10-09 PROCEDURE — 97116 GAIT TRAINING THERAPY: CPT

## 2022-10-09 PROCEDURE — 96372 THER/PROPH/DIAG INJ SC/IM: CPT

## 2022-10-09 PROCEDURE — G0378 HOSPITAL OBSERVATION PER HR: HCPCS

## 2022-10-09 PROCEDURE — 74011250636 HC RX REV CODE- 250/636: Performed by: INTERNAL MEDICINE

## 2022-10-09 PROCEDURE — 85025 COMPLETE CBC W/AUTO DIFF WBC: CPT

## 2022-10-09 PROCEDURE — 99215 OFFICE O/P EST HI 40 MIN: CPT | Performed by: PSYCHIATRY & NEUROLOGY

## 2022-10-09 PROCEDURE — 76700 US EXAM ABDOM COMPLETE: CPT

## 2022-10-09 PROCEDURE — 74011000250 HC RX REV CODE- 250: Performed by: INTERNAL MEDICINE

## 2022-10-09 PROCEDURE — 97161 PT EVAL LOW COMPLEX 20 MIN: CPT

## 2022-10-09 PROCEDURE — 74011250637 HC RX REV CODE- 250/637: Performed by: INTERNAL MEDICINE

## 2022-10-09 RX ORDER — POTASSIUM CHLORIDE 750 MG/1
40 TABLET, FILM COATED, EXTENDED RELEASE ORAL
Status: COMPLETED | OUTPATIENT
Start: 2022-10-09 | End: 2022-10-09

## 2022-10-09 RX ORDER — GUAIFENESIN 600 MG/1
600 TABLET, EXTENDED RELEASE ORAL EVERY 12 HOURS
Qty: 14 TABLET | Refills: 0 | Status: SHIPPED | OUTPATIENT
Start: 2022-10-09 | End: 2022-10-16

## 2022-10-09 RX ORDER — LANOLIN ALCOHOL/MO/W.PET/CERES
500 CREAM (GRAM) TOPICAL DAILY
Qty: 30 TABLET | Refills: 0 | Status: SHIPPED | OUTPATIENT
Start: 2022-10-09 | End: 2022-11-08

## 2022-10-09 RX ORDER — BENZONATATE 100 MG/1
100 CAPSULE ORAL
Qty: 21 CAPSULE | Refills: 0 | Status: SHIPPED | OUTPATIENT
Start: 2022-10-09 | End: 2022-10-16

## 2022-10-09 RX ORDER — CYANOCOBALAMIN 1000 UG/ML
1000 INJECTION, SOLUTION INTRAMUSCULAR; SUBCUTANEOUS DAILY
Status: DISCONTINUED | OUTPATIENT
Start: 2022-10-09 | End: 2022-10-09 | Stop reason: HOSPADM

## 2022-10-09 RX ADMIN — OXYCODONE 15 MG: 5 TABLET ORAL at 04:17

## 2022-10-09 RX ADMIN — POTASSIUM CHLORIDE 40 MEQ: 750 TABLET, FILM COATED, EXTENDED RELEASE ORAL at 15:37

## 2022-10-09 RX ADMIN — CYANOCOBALAMIN 1000 MCG: 1000 INJECTION, SOLUTION INTRAMUSCULAR; SUBCUTANEOUS at 12:17

## 2022-10-09 RX ADMIN — PANTOPRAZOLE SODIUM 40 MG: 40 TABLET, DELAYED RELEASE ORAL at 09:09

## 2022-10-09 RX ADMIN — PRAVASTATIN SODIUM 80 MG: 20 TABLET ORAL at 09:09

## 2022-10-09 RX ADMIN — DOXAZOSIN 8 MG: 2 TABLET ORAL at 09:09

## 2022-10-09 RX ADMIN — FINASTERIDE 5 MG: 5 TABLET, FILM COATED ORAL at 09:09

## 2022-10-09 RX ADMIN — FLUTICASONE PROPIONATE 2 SPRAY: 50 SPRAY, METERED NASAL at 03:50

## 2022-10-09 RX ADMIN — ENOXAPARIN SODIUM 30 MG: 100 INJECTION SUBCUTANEOUS at 09:08

## 2022-10-09 RX ADMIN — Medication 10 ML: at 06:00

## 2022-10-09 RX ADMIN — GUAIFENESIN 600 MG: 600 TABLET ORAL at 09:09

## 2022-10-09 RX ADMIN — POTASSIUM CHLORIDE 40 MEQ: 750 TABLET, FILM COATED, EXTENDED RELEASE ORAL at 12:17

## 2022-10-09 RX ADMIN — ACETAMINOPHEN 650 MG: 325 TABLET ORAL at 02:40

## 2022-10-09 RX ADMIN — DULOXETINE HYDROCHLORIDE 30 MG: 30 CAPSULE, DELAYED RELEASE ORAL at 09:08

## 2022-10-09 RX ADMIN — BENZONATATE 100 MG: 100 CAPSULE ORAL at 09:09

## 2022-10-09 RX ADMIN — OXYCODONE 15 MG: 5 TABLET ORAL at 10:35

## 2022-10-09 RX ADMIN — CELECOXIB 200 MG: 100 CAPSULE ORAL at 09:08

## 2022-10-09 NOTE — PROGRESS NOTES
Problem: Aspiration - Risk of  Goal: *Absence of aspiration  Outcome: Progressing Towards Goal     Problem: Patient Education: Go to Patient Education Activity  Goal: Patient/Family Education  Outcome: Progressing Towards Goal     Problem: Pressure Injury - Risk of  Goal: *Prevention of pressure injury  Description: Document Baudilio Scale and appropriate interventions in the flowsheet. Outcome: Progressing Towards Goal  Note: Pressure Injury Interventions:       Moisture Interventions: Absorbent underpads    Activity Interventions: Assess need for specialty bed    Mobility Interventions: HOB 30 degrees or less    Nutrition Interventions: Document food/fluid/supplement intake    Friction and Shear Interventions: HOB 30 degrees or less                Problem: Patient Education: Go to Patient Education Activity  Goal: Patient/Family Education  Outcome: Progressing Towards Goal     Problem: Falls - Risk of  Goal: *Absence of Falls  Description: Document Syed Fall Risk and appropriate interventions in the flowsheet.   Outcome: Progressing Towards Goal  Note: Fall Risk Interventions:  Mobility Interventions: Bed/chair exit alarm    Mentation Interventions: Bed/chair exit alarm    Medication Interventions: Bed/chair exit alarm    Elimination Interventions: Bed/chair exit alarm              Problem: Patient Education: Go to Patient Education Activity  Goal: Patient/Family Education  Outcome: Progressing Towards Goal

## 2022-10-09 NOTE — PROGRESS NOTES
PHYSICAL THERAPY EVALUATION/DISCHARGE  Patient: Unique Benavides (1 y.o. male)  Date: 10/9/2022  Primary Diagnosis: AMS (altered mental status) [R41.82]       Precautions:          ASSESSMENT  Based on the objective data described below, the patient presents with LUE pain, back pain and L knee pain following admission for AMS. Pt reports all pain is chronic and he has completed PT in the past. Today pt is oriented and son is present at bedside. Pt displays full ROM in all extremities, 5/5 pain and intact balance without AD. Pt ambulates with a steady gait and only mildly antalgic gait 2/2 end stage arthritis in L knee. Pt's son reports pt was told he had to lose weight before a TKA. Pt has no acute therapy needs and orders will be completed at this time. Functional Outcome Measure: The patient scored 28/28 on the Tinetti outcome measure which is indicative of low fall risk. Other factors to consider for discharge: none     Further skilled acute physical therapy is not indicated at this time. PLAN :  Recommendation for discharge: (in order for the patient to meet his/her long term goals)  No skilled physical therapy/ follow up rehabilitation needs identified at this time. This discharge recommendation:  Has been made in collaboration with the attending provider and/or case management    IF patient discharges home will need the following DME: none       SUBJECTIVE:   Patient stated Can I go home today? Obi Muniz    OBJECTIVE DATA SUMMARY:   HISTORY:    No past medical history on file. No past surgical history on file.     Prior level of function: Independent  Personal factors and/or comorbidities impacting plan of care: obesity, chronic pain    Home Situation  Home Environment: Private residence  One/Two Story Residence: Two story, live on 1st floor  Living Alone: Yes  Support Systems: Spouse/Significant Other, Child(kaitlin)  Patient Expects to be Discharged to[de-identified] Home with family assistance  Current DME Used/Available at Home: None    EXAMINATION/PRESENTATION/DECISION MAKING:   Critical Behavior:  Neurologic State: Alert  Orientation Level: Oriented X4  Cognition: Appropriate decision making, Appropriate for age attention/concentration, Appropriate safety awareness, Follows commands     Hearing: Auditory  Auditory Impairment: None  Skin:    Edema:   Range Of Motion:  AROM: Within functional limits                       Strength:    Strength: Within functional limits                    Tone & Sensation:                                  Coordination:     Vision:      Functional Mobility:  Bed Mobility:  Rolling: Independent  Supine to Sit: Independent  Sit to Supine: Independent  Scooting: Independent  Transfers:  Sit to Stand: Independent  Stand to Sit: Independent                       Balance:   Sitting: Intact  Standing: Intact  Ambulation/Gait Training:              Gait Description (WDL): Within defined limits                                          Stairs: Therapeutic Exercises:       Functional Measure:  Tinetti test:    Sitting Balance: 1  Arises: 2  Attempts to Rise: 2  Immediate Standing Balance: 2  Standing Balance: 2  Nudged: 2  Eyes Closed: 1  Turn 360 Degrees - Continuous/Discontinuous: 1  Turn 360 Degrees - Steady/Unsteady: 1  Sitting Down: 2  Balance Score: 16 Balance total score  Indication of Gait: 1  R Step Length/Height: 1  L Step Length/Height: 1  R Foot Clearance: 1  L Foot Clearance: 1  Step Symmetry: 1  Step Continuity: 1  Path: 2  Trunk: 2  Walking Time: 1  Gait Score: 12 Gait total score  Total Score: 28/28 Overall total score         Tinetti Tool Score Risk of Falls  <19 = High Fall Risk  19-24 = Moderate Fall Risk  25-28 = Low Fall Risk  Tinetti ME. Performance-Oriented Assessment of Mobility Problems in Elderly Patients. Tejeda 66; [id number].  (Scoring Description: PT Bulletin Feb. 10, 1993)    Older adults: Adelina Razo al, 2009; n = [address] [facility] elderly evaluated with ABC, LUTHER, ADL, and IADL)  · Mean LUTHER score for males aged 69-68 years = 26.21(3.40)  · Mean LUTHER score for females age 69-68 years = 25.16(4.30)  · Mean LUTHER score for males over 80 years = 23.29(6.02)  · Mean LUTHER score for females over 80 years = 17.20(8.32)            Physical Therapy Evaluation Charge Determination   History Examination Presentation Decision-Making   MEDIUM  Complexity : 1-2 comorbidities / personal factors will impact the outcome/ POC  MEDIUM Complexity : 3 Standardized tests and measures addressing body structure, function, activity limitation and / or participation in recreation  LOW Complexity : Stable, uncomplicated  Other outcome measures Tinetti  LOW       Based on the above components, the patient evaluation is determined to be of the following complexity level: LOW   Activity Tolerance:   Good      After treatment patient left in no apparent distress:   Supine in bed and Call bell within reach    COMMUNICATION/EDUCATION:   The patients plan of care was discussed with: Registered nurse. Fall prevention education was provided and the patient/caregiver indicated understanding., Patient/family have participated as able in goal setting and plan of care. , and Patient/family agree to work toward stated goals and plan of care.     Thank you for this referral.  Opal Vincent, PT   Time Calculation: 24 mins

## 2022-10-09 NOTE — DISCHARGE SUMMARY
[facility]  [address] [facility], [address] [place] [address]  [phone]    Physician Discharge Summary     Patient ID:  Renan Germain  [id number]  79 y.o.  1955    Admit date: 10/7/2022    Discharge date and time: 10/9/2022 3:20 PM    Admission Diagnoses: AMS (altered mental status) [R41.82]    Discharge Diagnoses:  Principal Diagnosis <principal problem not specified>                                            Active Problems:    AMS (altered mental status) (10/7/2022)         Hospital Course:     Acute metabolic encephalopathy POA: With associated agitation. Possibly due to marijuana. Also patient thinks he may have taken extra dose of his oxycodone. Per outpatient records, patient recently prescribed extended release oxycodone; advised patient not to take extended release pain medications as this can stay in system longer. Discussed polypharmacy with patient and son at bedside; advised to follow-up with PCP outpatient to possibly wean some medications. CT head with no acute concerns. Unable to obtain MRI brain due to back pain; evaluated by neurology who did not feel brain MRI needed due to low suspicion for stroke. Neurology also felt EEG not needed. UA with no evidence of infection. TSH within normal limits. B12 on lower end of normal; continue supplements on discharge. Slightly elevated ammonia but doubt contributing to mental status. Evaluated by neurology follow-up outpatient. Elevated ammonia: Doubt significant enough to be contributing to altered mental status. Ultrasound with no evidence of cirrhosis and no evidence of this on blood work. Suspect due to narcotic use. Follow-up outpatient with PCP. Left shoulder pain POA: Shoulder x-ray showing mild acromioclavicular joint separation. Ortho evaluated; follow-up orthopedics outpatient. Sore throat POA: No associated fevers, no dyspnea. Obtain strep culture; follow results. Supportive care. Follow-up PCP OP. Chronic back pain/OA : Continue home Celebrex, nortriptyline,Cymbalta dose for now. Continue home immediate release oxycodone; patient recently given prescription for extended release oxycodone however according to family they are unable to determine whether patient has actually started taking this -would recommend avoiding extended release due to patient's age. discussed polypharmacy and advised follow-up with PCP and possibly weaning medications. Diabetes: A1c 6.3. Diet modifications. Follow-up with PCP outpatient. Hypertension: Continue Cardura. Hyperlipidemia: Continue statin. BPH: Continue finasteride. GERD: Continue PPI and H2 blocker. PCP: Linda Diaz MD     Consults: Neurology and Orthopedic Surgery    Significant Diagnostic Studies:     Shoulder Xray   IMPRESSION  Mild acromioclavicular joint separation. Lawernce Roughen Discharge Exam:  Physical Exam:    Gen:  Well-developed, well-nourished, in no acute distress  HEENT:  Pink conjunctivae, PERRL, hearing intact to voice  Resp:  No accessory muscle use, clear breath sounds without wheezes rales or rhonchi  Card:  RRR, No murmurs, normal S1, S2, no peripheral edema  Abd:  Soft, non-tender, non-distended, normoactive bowel sounds are present  Musc:  No cyanosis or clubbing  Skin:  No rashes or ulcers, skin turgor is good  Neuro:  Cranial nerves 3-12 are grossly intact, follows commands appropriately  Psych:  Oriented to person, place, and time, Alert with good insight     Disposition: home  Discharge Condition: Stable    Patient Instructions:   Current Discharge Medication List        START taking these medications    Details   benzocaine-menthoL (CHLORASEPTIC) 6-10 mg lozg 1 Lozenge by Mucous Membrane route as needed for Cough. Qty: 30 Lozenge, Refills: 0      guaiFENesin ER (MUCINEX) 600 mg ER tablet Take 1 Tablet by mouth every twelve (12) hours for 7 days.   Qty: 14 Tablet, Refills: 0      benzonatate (TESSALON) 100 mg capsule Take 1 Capsule by mouth three (3) times daily as needed for Cough for up to 7 days. Qty: 21 Capsule, Refills: 0      cyanocobalamin (Vitamin B-12) 500 mcg tablet Take 1 Tablet by mouth daily for 30 days. Qty: 30 Tablet, Refills: 0           CONTINUE these medications which have NOT CHANGED    Details   diclofenac EC (VOLTAREN) 75 mg EC tablet Take  by mouth two (2) times daily as needed for Pain. celecoxib (CELEBREX) 200 mg capsule Take  by mouth two (2) times a day. finasteride (PROSCAR) 5 mg tablet Take 5 mg by mouth daily. sildenafil citrate (VIAGRA) 100 mg tablet Take 25 mg by mouth daily as needed for Erectile Dysfunction. hydrOXYzine HCL (ATARAX) 25 mg tablet Take  by mouth three (3) times daily as needed. omeprazole (PRILOSEC) 40 mg capsule Take 40 mg by mouth daily. nortriptyline (PAMELOR) 50 mg capsule Take 50 mg by mouth nightly. oxyCODONE IR (OXY-IR) 15 mg immediate release tablet Take 15 mg by mouth every six (6) hours as needed for Pain. doxazosin (CARDURA) 8 mg tablet Take 8 mg by mouth daily. famotidine (PEPCID) 40 mg tablet Take 40 mg by mouth nightly. triamcinolone acetonide (KENALOG) 0.1 % topical cream Apply  to affected area two (2) times a day. use thin layer      DULoxetine (CYMBALTA) 60 mg capsule Take 60 mg by mouth daily. polyethylene glycol (MIRALAX) 17 gram packet Take 17 g by mouth daily. pravastatin (PRAVACHOL) 80 mg tablet Take 80 mg by mouth daily.            Activity: Activity as tolerated  Diet: Cardiac Diet  Wound Care: None needed    Follow-up with  Follow-up Information       Follow up With Specialties Details Why Contact Info    Cedrick Beth MD Orthopedic Surgery Follow up in 2 week(s) [facility] [address] [place] [address]  [address] [place] [address],[address]      John Barron MD Family Medicine Schedule an appointment as soon as possible for a visit in 1 week(s) Hospital Follow  Up [address] [facility],[address]  [phone]      Phong Doss MD Neurology Schedule an appointment as soon as possible for a visit As needed [address]  [phone]              Follow-up tests/labs as above.      Signed:  Lily Dugan DO  10/9/2022  3:20 PM  **I personally spent 35 min on discharge**

## 2022-10-09 NOTE — PROGRESS NOTES
Problem: Aspiration - Risk of  Goal: *Absence of aspiration  Outcome: Progressing Towards Goal     Problem: Patient Education: Go to Patient Education Activity  Goal: Patient/Family Education  Outcome: Progressing Towards Goal     Problem: Pressure Injury - Risk of  Goal: *Prevention of pressure injury  Description: Document Baudilio Scale and appropriate interventions in the flowsheet. Outcome: Progressing Towards Goal  Note: Pressure Injury Interventions:       Moisture Interventions: Absorbent underpads, Apply protective barrier, creams and emollients, Limit adult briefs, Maintain skin hydration (lotion/cream), Minimize layers, Moisture barrier    Activity Interventions: Increase time out of bed, Pressure redistribution bed/mattress(bed type), PT/OT evaluation    Mobility Interventions: HOB 30 degrees or less, Pressure redistribution bed/mattress (bed type), PT/OT evaluation, Turn and reposition approx. every two hours(pillow and wedges)    Nutrition Interventions: Document food/fluid/supplement intake    Friction and Shear Interventions: HOB 30 degrees or less                Problem: Patient Education: Go to Patient Education Activity  Goal: Patient/Family Education  Outcome: Progressing Towards Goal     Problem: Falls - Risk of  Goal: *Absence of Falls  Description: Document Syed Fall Risk and appropriate interventions in the flowsheet.   Outcome: Progressing Towards Goal  Note: Fall Risk Interventions:  Mobility Interventions: Strengthening exercises (ROM-active/passive), PT Consult for assist device competence, PT Consult for mobility concerns, Patient to call before getting OOB, OT consult for ADLs    Mentation Interventions: Door open when patient unattended, Bed/chair exit alarm, Family/sitter at bedside    Medication Interventions: Teach patient to arise slowly, Patient to call before getting OOB, Evaluate medications/consider consulting pharmacy, Bed/chair exit alarm    Elimination Interventions: Urinal in reach, Toileting schedule/hourly rounds, Toilet paper/wipes in reach, Stay With Me (per policy), Patient to call for help with toileting needs, Elevated toilet seat, Call light in reach              Problem: Patient Education: Go to Patient Education Activity  Goal: Patient/Family Education  Outcome: Progressing Towards Goal

## 2022-10-09 NOTE — PROGRESS NOTES
Discharge instructions provided to patient and son, questions answered.   Patient discharge home via wheelchair to main entrance

## 2022-10-09 NOTE — PROGRESS NOTES
[facility] Progress Note    Patient ID:  Michaelle Vu  [id number]  79 y.o.  1955      Subjective:   History:  Michaelle Vu is a 79 y.o. male who has history of gastric bypasss, chronic lower back pain for 15 yrs  and taking pain med who was bought by family for increased confusion, agitation and slurred speech. Patient tried marijuana for the first time. Patient feels better today. ROS:  Per HPI-  Otherwise the remainder of the review of system was negative      Social Hx:  Social History     Socioeconomic History    Marital status:        Meds:  No current facility-administered medications on file prior to encounter. Current Outpatient Medications on File Prior to Encounter   Medication Sig Dispense Refill    diclofenac EC (VOLTAREN) 75 mg EC tablet Take  by mouth two (2) times daily as needed for Pain. celecoxib (CELEBREX) 200 mg capsule Take  by mouth two (2) times a day. finasteride (PROSCAR) 5 mg tablet Take 5 mg by mouth daily. sildenafil citrate (VIAGRA) 100 mg tablet Take 25 mg by mouth daily as needed for Erectile Dysfunction. hydrOXYzine HCL (ATARAX) 25 mg tablet Take  by mouth three (3) times daily as needed. omeprazole (PRILOSEC) 40 mg capsule Take 40 mg by mouth daily. nortriptyline (PAMELOR) 50 mg capsule Take 50 mg by mouth nightly. oxyCODONE IR (OXY-IR) 15 mg immediate release tablet Take 15 mg by mouth every six (6) hours as needed for Pain. doxazosin (CARDURA) 8 mg tablet Take 8 mg by mouth daily. famotidine (PEPCID) 40 mg tablet Take 40 mg by mouth nightly. triamcinolone acetonide (KENALOG) 0.1 % topical cream Apply  to affected area two (2) times a day. use thin layer      DULoxetine (CYMBALTA) 60 mg capsule Take 60 mg by mouth daily. polyethylene glycol (MIRALAX) 17 gram packet Take 17 g by mouth daily. pravastatin (PRAVACHOL) 80 mg tablet Take 80 mg by mouth daily.          Imaging:    CT Results (recent):  [facility]    CT CODE NEURO HEAD WO CONTRAST    Narrative  EXAM: CT CODE NEURO HEAD WO CONTRAST    INDICATION: abnormal speech    COMPARISON: None. CONTRAST: None. TECHNIQUE: Unenhanced CT of the head was performed using 5 mm images. Brain and  bone windows were generated. Coronal and sagittal reformats. CT dose reduction  was achieved through use of a standardized protocol tailored for this  examination and automatic exposure control for dose modulation. FINDINGS: Limited by motion. No acute intracranial abnormality. Limited by  motion. The ventricles and sulci are normal in size, shape and configuration. . There is  no significant white matter disease. There is no intracranial hemorrhage,  extra-axial collection, or mass effect. The basilar cisterns are open. No CT  evidence of acute infarct. The bone windows demonstrate no abnormalities. The visualized portions of the  paranasal sinuses and mastoid air cells are clear. Impression  No acute intracranial abnormality. MRI Results (recent):  No results found for this or any previous visit. IR Results (recent):  No results found for this or any previous visit. VAS/US Results (recent):  No results found for this or any previous visit. Reviewed records in [email or web address] tab today    Lab Review     Admission on 10/07/2022   Component Date Value Ref Range Status    SAMPLES BEING HELD 10/07/2022 1DK GRN,1BLUE,1SST,1RED   Final    COMMENT 10/07/2022 Add-on orders for these samples will be processed based on acceptable specimen integrity and analyte stability, which may vary by analyte.     Final    WBC 10/07/2022 7.3  4.1 - 11.1 K/uL Final    RBC 10/07/2022 4.26  4.10 - 5.70 M/uL Final    HGB 10/07/2022 12.7  12.1 - 17.0 g/dL Final    HCT 10/07/2022 39.0  36.6 - 50.3 % Final    MCV 10/07/2022 91.5  80.0 - 99.0 FL Final    MCH 10/07/2022 29.8  26.0 - 34.0 PG Final    MCHC 10/07/2022 32.6  30.0 - 36.5 g/dL Final    RDW 10/07/2022 14.6 (A)  11.5 - 14.5 % Final    PLATELET 97/00/8656 633  150 - 400 K/uL Final    MPV 10/07/2022 9.0  8.9 - 12.9 FL Final    NRBC 10/07/2022 0.0  0  WBC Final    ABSOLUTE NRBC 10/07/2022 0.00  0.00 - 0.01 K/uL Final    NEUTROPHILS 10/07/2022 71  32 - 75 % Final    LYMPHOCYTES 10/07/2022 22  12 - 49 % Final    MONOCYTES 10/07/2022 5  5 - 13 % Final    EOSINOPHILS 10/07/2022 1  0 - 7 % Final    BASOPHILS 10/07/2022 1  0 - 1 % Final    IMMATURE GRANULOCYTES 10/07/2022 0  0.0 - 0.5 % Final    ABS. NEUTROPHILS 10/07/2022 5.1  1.8 - 8.0 K/UL Final    ABS. LYMPHOCYTES 10/07/2022 1.6  0.8 - 3.5 K/UL Final    ABS. MONOCYTES 10/07/2022 0.4  0.0 - 1.0 K/UL Final    ABS. EOSINOPHILS 10/07/2022 0.1  0.0 - 0.4 K/UL Final    ABS. BASOPHILS 10/07/2022 0.1  0.0 - 0.1 K/UL Final    ABS. IMM. GRANS. 10/07/2022 0.0  0.00 - 0.04 K/UL Final    DF 10/07/2022 AUTOMATED    Final    Sodium 10/07/2022 137  136 - 145 mmol/L Final    Potassium 10/07/2022 3.5  3.5 - 5.1 mmol/L Final    Chloride 10/07/2022 103  97 - 108 mmol/L Final    CO2 10/07/2022 30  21 - 32 mmol/L Final    Anion gap 10/07/2022 4 (A)  5 - 15 mmol/L Final    Glucose 10/07/2022 152 (A)  65 - 100 mg/dL Final    BUN 10/07/2022 13  6 - 20 MG/DL Final    Creatinine 10/07/2022 0.87  0.70 - 1.30 MG/DL Final    BUN/Creatinine ratio 10/07/2022 15  12 - 20   Final    eGFR 10/07/2022 >60  >60 ml/min/1.73m2 Final    Comment:      Pediatric calculator link: [email or web address]_[email or web address]       Effective Oct 3, 2022       These results are not intended for use in patients <25years of age. eGFR results are calculated without a race factor using  the 2021 CKD-EPI equation. Careful clinical correlation is recommended, particularly when comparing to results calculated using previous equations.   The CKD-EPI equation is less accurate in patients with extremes of muscle mass, extra-renal metabolism of creatinine, excessive creatine ingestion, or following therapy that affects renal tubular secretion. Calcium 10/07/2022 8.9  8.5 - 10.1 MG/DL Final    Ammonia, plasma 10/07/2022 19  <32 UMOL/L Final    Ammonia determinations are subject to marked lability. Upon standing, ammonia levels increase rapidly due to red cell metabolism. Concentrations may more than double in plasma if sample is stored at room temperature for 6 hours. AMPHETAMINES 10/07/2022 Negative  NEG   Final    BARBITURATES 10/07/2022 Negative  NEG   Final    BENZODIAZEPINES 10/07/2022 Negative  NEG   Final    COCAINE 10/07/2022 Negative  NEG   Final    METHADONE 10/07/2022 Negative  NEG   Final    OPIATES 10/07/2022 Positive (A)  NEG   Final    PCP(PHENCYCLIDINE) 10/07/2022 Negative  NEG   Final    THC (TH-CANNABINOL) 10/07/2022 Positive (A)  NEG   Final    Drug screen comment 10/07/2022 (NOTE)   Final    Comment: This test is a screen for drugs of abuse in a medical setting only   (i.e., they are unconfirmed results and as such must not be used for   non-medical purposes e.g., employment testing, legal testing). Due to   its inherent nature, false positive (FP) and false negative (FN)   results may be obtained. Therefore, if necessary for medical care,   recommend confirmation of positive findings by GC/MS. The cutoff   values (i.e., the level at which this screening test becomes positive   for a given drug group) are:    Amphetamine/Methamphetamine: 300 ng/mL  Barbiturates:                200 ng/mL  Benzodiazepines:             200 ng/mL  Cocaine:                     150 ng/mL  Methadone:                   300 ng/mL  Opiates:                     300 ng/mL   Phencyclidine, PCP:           25 ng/mL  Marijuana, THC:               50 ng/mL    This screening test can identify the presence of the following drugs   when above the cutoff value; see list posted on the intranet.  It can   be viewed by [employer] in sequence the following from the [address] home   page: [place]; [address] [place] , Resources; [facility], Physician Resources Q to Z; \"UDS (Urine Drug Screen   Automated) List of Detectable Drugs. \"     Or use web address:   [email or web address]  UDS%20List%20of%20Detectable%20Drugs. pdf      Color 10/07/2022 YELLOW/STRAW    Final    Color Reference Range: Straw, Yellow or Dark Yellow    Appearance 10/07/2022 CLEAR  CLEAR   Final    Specific gravity 10/07/2022 1.011  1.003 - 1.030   Final    pH (UA) 10/07/2022 8.0  5.0 - 8.0   Final    Protein 10/07/2022 Negative  NEG mg/dL Final    Glucose 10/07/2022 Negative  NEG mg/dL Final    Ketone 10/07/2022 Negative  NEG mg/dL Final    Bilirubin 10/07/2022 Negative  NEG   Final    Blood 10/07/2022 Negative  NEG   Final    Urobilinogen 10/07/2022 0.2  0.2 - 1.0 EU/dL Final    Nitrites 10/07/2022 Negative  NEG   Final    Leukocyte Esterase 10/07/2022 Negative  NEG   Final    UA:UC IF INDICATED 10/07/2022 CULTURE NOT INDICATED BY UA RESULT  CNI   Final    WBC 10/07/2022 0-4  0 - 4 /hpf Final    RBC 10/07/2022 0-5  0 - 5 /hpf Final    Epithelial cells 10/07/2022 FEW  FEW /lpf Final    Epithelial cell category consists of squamous cells and /or transitional urothelial cells. Renal tubular cells, if present, are separately identified as such. Bacteria 10/07/2022 Negative  NEG /hpf Final    Hyaline cast 10/07/2022 0-2  0 - 2 /lpf Final    ALCOHOL(ETHYL),SERUM 10/07/2022 <10  <10 MG/DL Final    Glucose (POC) 10/08/2022 104  65 - 117 mg/dL Final    Comment: (NOTE)  The FDA has indicated that no capillary point of care blood glucose  monitoring systems are approved for use in \"critically ill\" patients,  however they have not defined this population.  The College of  American Pathologists has recommended that these devices should not  be used in cases such as severe hypotension, dehydration, shock, and  hyperglycemic-hyperosmolar state, amongst others. Venous or arterial  collection is the recommended specimen for testing these patients. Performed by 10/08/2022 KATIE Miles   Final    Sodium 10/08/2022 142  136 - 145 mmol/L Final    Potassium 10/08/2022 3.2 (A)  3.5 - 5.1 mmol/L Final    Chloride 10/08/2022 109 (A)  97 - 108 mmol/L Final    CO2 10/08/2022 27  21 - 32 mmol/L Final    Anion gap 10/08/2022 6  5 - 15 mmol/L Final    Glucose 10/08/2022 116 (A)  65 - 100 mg/dL Final    BUN 10/08/2022 14  6 - 20 MG/DL Final    Creatinine 10/08/2022 0.75  0.70 - 1.30 MG/DL Final    BUN/Creatinine ratio 10/08/2022 19  12 - 20   Final    eGFR 10/08/2022 >60  >60 ml/min/1.73m2 Final    Comment:      Pediatric calculator link: [email or web address]_[email or web address]       Effective Oct 3, 2022       These results are not intended for use in patients <25years of age. eGFR results are calculated without a race factor using  the 2021 CKD-EPI equation. Careful clinical correlation is recommended, particularly when comparing to results calculated using previous equations. The CKD-EPI equation is less accurate in patients with extremes of muscle mass, extra-renal metabolism of creatinine, excessive creatine ingestion, or following therapy that affects renal tubular secretion. Calcium 10/08/2022 8.6  8.5 - 10.1 MG/DL Final    Bilirubin, total 10/08/2022 0.4  0.2 - 1.0 MG/DL Final    ALT (SGPT) 10/08/2022 29  12 - 78 U/L Final    AST (SGOT) 10/08/2022 36  15 - 37 U/L Final    Alk.  phosphatase 10/08/2022 60  45 - 117 U/L Final    Protein, total 10/08/2022 7.2  6.4 - 8.2 g/dL Final    Albumin 10/08/2022 3.3 (A)  3.5 - 5.0 g/dL Final    Globulin 10/08/2022 3.9  2.0 - 4.0 g/dL Final    A-G Ratio 10/08/2022 0.8 (A)  1.1 - 2.2   Final    Glucose (POC) 10/08/2022 103  65 - 117 mg/dL Final    Comment: (NOTE)  The FDA has indicated that no capillary point of care blood glucose  monitoring systems are approved for use in \"critically ill\" patients,  however they have not defined this population. The College of  American Pathologists has recommended that these devices should not  be used in cases such as severe hypotension, dehydration, shock, and  hyperglycemic-hyperosmolar state, amongst others. Venous or arterial  collection is the recommended specimen for testing these patients. Performed by 10/08/2022 Nacho Dixon   Final    Glucose (POC) 10/08/2022 123 (A)  65 - 117 mg/dL Final    Comment: (NOTE)  The FDA has indicated that no capillary point of care blood glucose  monitoring systems are approved for use in \"critically ill\" patients,  however they have not defined this population. The College of  American Pathologists has recommended that these devices should not  be used in cases such as severe hypotension, dehydration, shock, and  hyperglycemic-hyperosmolar state, amongst others. Venous or arterial  collection is the recommended specimen for testing these patients. Performed by 10/08/2022 Aniket Stoddard   Final    TSH 10/08/2022 0.94  0.36 - 3.74 uIU/mL Final    Comment:      Due to TSH heterogeneity, both structurally and degree of glycosylation, monoclonal antibodies used in the TSH assay may not accurately quantitate TSH. Therefore, this result should be correlated with clinical findings as well as with other assessments of thyroid function, e.g., free T4, free T3. Ammonia, plasma 10/08/2022 39 (A)  <32 UMOL/L Final    Ammonia determinations are subject to marked lability. Upon standing, ammonia levels increase rapidly due to red cell metabolism. Concentrations may more than double in plasma if sample is stored at room temperature for 6 hours.     Hemoglobin A1c 10/08/2022 6.3 (A)  4.0 - 5.6 % Final    Comment: NEW METHOD  PLEASE NOTE NEW REFERENCE RANGE  (NOTE)  HbA1C Interpretive Ranges  <5.7              Normal  5.7 - 6.4         Consider Prediabetes  >6.5 Consider Diabetes      Est. average glucose 10/08/2022 134  mg/dL Final    Vitamin B12 10/08/2022 202  193 - 986 pg/mL Final    Glucose (POC) 10/08/2022 112  65 - 117 mg/dL Final    Comment: (NOTE)  The FDA has indicated that no capillary point of care blood glucose  monitoring systems are approved for use in \"critically ill\" patients,  however they have not defined this population. The College of  American Pathologists has recommended that these devices should not  be used in cases such as severe hypotension, dehydration, shock, and  hyperglycemic-hyperosmolar state, amongst others. Venous or arterial  collection is the recommended specimen for testing these patients. Performed by 10/08/2022 Gagan Chaney   Final    WBC 10/09/2022 8.2  4.1 - 11.1 K/uL Final    RBC 10/09/2022 4.07 (A)  4.10 - 5.70 M/uL Final    HGB 10/09/2022 12.2  12.1 - 17.0 g/dL Final    HCT 10/09/2022 36.1 (A)  36.6 - 50.3 % Final    MCV 10/09/2022 88.7  80.0 - 99.0 FL Final    MCH 10/09/2022 30.0  26.0 - 34.0 PG Final    MCHC 10/09/2022 33.8  30.0 - 36.5 g/dL Final    RDW 10/09/2022 15.0 (A)  11.5 - 14.5 % Final    PLATELET 78/49/0569 414  150 - 400 K/uL Final    MPV 10/09/2022 9.0  8.9 - 12.9 FL Final    NRBC 10/09/2022 0.0  0  WBC Final    ABSOLUTE NRBC 10/09/2022 0.00  0.00 - 0.01 K/uL Final    NEUTROPHILS 10/09/2022 60  32 - 75 % Final    LYMPHOCYTES 10/09/2022 28  12 - 49 % Final    MONOCYTES 10/09/2022 10  5 - 13 % Final    EOSINOPHILS 10/09/2022 1  0 - 7 % Final    BASOPHILS 10/09/2022 1  0 - 1 % Final    IMMATURE GRANULOCYTES 10/09/2022 0  0.0 - 0.5 % Final    ABS. NEUTROPHILS 10/09/2022 4.9  1.8 - 8.0 K/UL Final    ABS. LYMPHOCYTES 10/09/2022 2.3  0.8 - 3.5 K/UL Final    ABS. MONOCYTES 10/09/2022 0.9  0.0 - 1.0 K/UL Final    ABS. EOSINOPHILS 10/09/2022 0.1  0.0 - 0.4 K/UL Final    ABS. BASOPHILS 10/09/2022 0.1  0.0 - 0.1 K/UL Final    ABS. IMM. GRANS.  10/09/2022 0.0  0.00 - 0.04 K/UL Final    DF 10/09/2022 AUTOMATED Final    Sodium 10/09/2022 140  136 - 145 mmol/L Final    Potassium 10/09/2022 3.1 (A)  3.5 - 5.1 mmol/L Final    Chloride 10/09/2022 107  97 - 108 mmol/L Final    CO2 10/09/2022 26  21 - 32 mmol/L Final    Anion gap 10/09/2022 7  5 - 15 mmol/L Final    Glucose 10/09/2022 101 (A)  65 - 100 mg/dL Final    BUN 10/09/2022 19  6 - 20 MG/DL Final    Creatinine 10/09/2022 0.76  0.70 - 1.30 MG/DL Final    BUN/Creatinine ratio 10/09/2022 25 (A)  12 - 20   Final    eGFR 10/09/2022 >60  >60 ml/min/1.73m2 Final    Comment:      Pediatric calculator link: [email or web address]_[email or web address]       Effective Oct 3, 2022       These results are not intended for use in patients <25years of age. eGFR results are calculated without a race factor using  the 2021 CKD-EPI equation. Careful clinical correlation is recommended, particularly when comparing to results calculated using previous equations. The CKD-EPI equation is less accurate in patients with extremes of muscle mass, extra-renal metabolism of creatinine, excessive creatine ingestion, or following therapy that affects renal tubular secretion. Calcium 10/09/2022 8.8  8.5 - 10.1 MG/DL Final    Bilirubin, total 10/09/2022 0.6  0.2 - 1.0 MG/DL Final    ALT (SGPT) 10/09/2022 30  12 - 78 U/L Final    AST (SGOT) 10/09/2022 35  15 - 37 U/L Final    Alk. phosphatase 10/09/2022 60  45 - 117 U/L Final    Protein, total 10/09/2022 7.2  6.4 - 8.2 g/dL Final    Albumin 10/09/2022 3.4 (A)  3.5 - 5.0 g/dL Final    Globulin 10/09/2022 3.8  2.0 - 4.0 g/dL Final    A-G Ratio 10/09/2022 0.9 (A)  1.1 - 2.2   Final    Glucose (POC) 10/09/2022 92  65 - 117 mg/dL Final    Comment: (NOTE)  The FDA has indicated that no capillary point of care blood glucose  monitoring systems are approved for use in \"critically ill\" patients,  however they have not defined this population.  The College of  American Pathologists has recommended that these devices should not  be used in cases such as severe hypotension, dehydration, shock, and  hyperglycemic-hyperosmolar state, amongst others. Venous or arterial  collection is the recommended specimen for testing these patients. Performed by 10/09/2022 Pat Ritter   Final         Objective:       Exam:  Visit Vitals  /83 (BP 1 Location: Right upper arm, BP Patient Position: Sitting)   Pulse 80   Temp 97.7 °F (36.5 °C)   Resp 16   Ht 5' 8\" (1.727 m)   Wt 125 kg (275 lb 9.2 oz)   SpO2 96%   BMI 41.90 kg/m²     Gen: Awake, alert, follows commands  Appropriate appearance, normal speech. Oriented to all spheres. No visual field defect on confrontation exam.  Full eyes movement, with no nystagmus, no diplopia, no ptosis. Normal gag and swallow. All remaining cranial nerves were normal  Motor function: 5/5 in all extremities  Sensory: intact to LT, PP and JPS  DTRs ++ in all extremities, (-) Babinski  Good FTN and HTS   Gait: Deferred    Assessment:     1. Altered mental status, unspecified altered mental status type    2. Encephalopathy acute      Consideration includes acute encephalopathy due to polypharmacy/pain meds along with taking marijuana for the first time. Unable to tolerate MRI brain due to severe lower back pain. CT head no acute process     Utox (+) Opiate and THC    Vit B12 deficiency    Abnormal TSH and ammonia      Plan:   1. I had a long discussion with patient and son. Discussed diagnosis, prognosis, pathophysiology and available treatment. Reviewed test results. All questions were answered. 2. Recommend Vit B12 1000 mcg IM injection then PO as out patient  3. Address abnormal TSH and ammonia c/o hospitalist  4. Will  defer pain management to medical team    Please call for questions    35 mins of time spent, 50% of which was spent on counseling and coordination of care.       Jackelyn Zapien MD  Diplomate, American Board of Psychiatry and Neurology  Diplomate, Neuromuscular Medicine  Diplomate, American Board of Electrodiagnostic Medicine
